# Patient Record
Sex: FEMALE | Race: OTHER | HISPANIC OR LATINO
[De-identification: names, ages, dates, MRNs, and addresses within clinical notes are randomized per-mention and may not be internally consistent; named-entity substitution may affect disease eponyms.]

---

## 2017-08-14 ENCOUNTER — APPOINTMENT (OUTPATIENT)
Dept: MAMMOGRAPHY | Facility: CLINIC | Age: 67
End: 2017-08-14

## 2019-09-25 ENCOUNTER — APPOINTMENT (OUTPATIENT)
Dept: NEUROLOGY | Facility: CLINIC | Age: 69
End: 2019-09-25
Payer: MEDICARE

## 2019-09-25 VITALS
HEIGHT: 59 IN | BODY MASS INDEX: 19.96 KG/M2 | WEIGHT: 99 LBS | DIASTOLIC BLOOD PRESSURE: 70 MMHG | SYSTOLIC BLOOD PRESSURE: 140 MMHG

## 2019-09-25 DIAGNOSIS — R20.2 PARESTHESIA OF SKIN: ICD-10-CM

## 2019-09-25 DIAGNOSIS — Z86.39 PERSONAL HISTORY OF OTHER ENDOCRINE, NUTRITIONAL AND METABOLIC DISEASE: ICD-10-CM

## 2019-09-25 DIAGNOSIS — Z86.79 PERSONAL HISTORY OF OTHER DISEASES OF THE CIRCULATORY SYSTEM: ICD-10-CM

## 2019-09-25 DIAGNOSIS — Z87.891 PERSONAL HISTORY OF NICOTINE DEPENDENCE: ICD-10-CM

## 2019-09-25 DIAGNOSIS — G62.9 POLYNEUROPATHY, UNSPECIFIED: ICD-10-CM

## 2019-09-25 PROCEDURE — 99204 OFFICE O/P NEW MOD 45 MIN: CPT

## 2019-09-25 RX ORDER — METFORMIN HYDROCHLORIDE 500 MG/1
500 TABLET, COATED ORAL
Refills: 0 | Status: ACTIVE | COMMUNITY

## 2019-09-25 RX ORDER — TROSPIUM CHLORIDE 20 MG/1
20 TABLET ORAL
Refills: 0 | Status: ACTIVE | COMMUNITY

## 2019-09-25 RX ORDER — GABAPENTIN 300 MG/1
300 CAPSULE ORAL
Refills: 0 | Status: ACTIVE | COMMUNITY

## 2019-09-25 RX ORDER — METOPROLOL SUCCINATE 100 MG/1
100 TABLET, EXTENDED RELEASE ORAL
Refills: 0 | Status: ACTIVE | COMMUNITY

## 2019-09-25 RX ORDER — LEVOTHYROXINE SODIUM 50 UG/1
50 TABLET ORAL
Refills: 0 | Status: ACTIVE | COMMUNITY

## 2019-09-25 RX ORDER — ROSUVASTATIN CALCIUM 10 MG/1
10 TABLET, FILM COATED ORAL
Refills: 0 | Status: ACTIVE | COMMUNITY

## 2019-09-25 RX ORDER — ASPIRIN 81 MG
81 TABLET, DELAYED RELEASE (ENTERIC COATED) ORAL
Refills: 0 | Status: ACTIVE | COMMUNITY

## 2019-11-05 ENCOUNTER — APPOINTMENT (OUTPATIENT)
Dept: NEUROLOGY | Facility: CLINIC | Age: 69
End: 2019-11-05
Payer: MEDICARE

## 2019-11-05 PROCEDURE — 95886 MUSC TEST DONE W/N TEST COMP: CPT

## 2019-11-05 PROCEDURE — 95910 NRV CNDJ TEST 7-8 STUDIES: CPT

## 2021-06-01 ENCOUNTER — OUTPATIENT (OUTPATIENT)
Dept: OUTPATIENT SERVICES | Facility: HOSPITAL | Age: 71
LOS: 1 days | End: 2021-06-01
Payer: MEDICARE

## 2021-06-01 PROCEDURE — G9005: CPT

## 2021-06-03 ENCOUNTER — EMERGENCY (EMERGENCY)
Facility: HOSPITAL | Age: 71
LOS: 1 days | Discharge: HOME | End: 2021-06-03
Attending: EMERGENCY MEDICINE
Payer: MEDICARE

## 2021-06-03 VITALS
TEMPERATURE: 98 F | DIASTOLIC BLOOD PRESSURE: 71 MMHG | WEIGHT: 102.96 LBS | HEART RATE: 82 BPM | OXYGEN SATURATION: 98 % | HEIGHT: 58.66 IN | SYSTOLIC BLOOD PRESSURE: 206 MMHG | RESPIRATION RATE: 18 BRPM

## 2021-06-03 PROCEDURE — 99285 EMERGENCY DEPT VISIT HI MDM: CPT

## 2021-06-03 NOTE — ED ADULT TRIAGE NOTE - CHIEF COMPLAINT QUOTE
PT presents to ED for abdominal pain after eating and bloating/constipation PT presents to ED for abdominal pain after eating and bloating/constipation. PT missed dose of BP meds tonight

## 2021-06-04 VITALS
OXYGEN SATURATION: 99 % | DIASTOLIC BLOOD PRESSURE: 67 MMHG | HEART RATE: 70 BPM | SYSTOLIC BLOOD PRESSURE: 131 MMHG | TEMPERATURE: 100 F | RESPIRATION RATE: 19 BRPM

## 2021-06-04 LAB
ALBUMIN SERPL ELPH-MCNC: 4.1 G/DL — SIGNIFICANT CHANGE UP (ref 3.3–5.2)
ALP SERPL-CCNC: 109 U/L — SIGNIFICANT CHANGE UP (ref 40–120)
ALT FLD-CCNC: 11 U/L — SIGNIFICANT CHANGE UP
ANION GAP SERPL CALC-SCNC: 10 MMOL/L — SIGNIFICANT CHANGE UP (ref 5–17)
APPEARANCE UR: CLEAR — SIGNIFICANT CHANGE UP
AST SERPL-CCNC: 22 U/L — SIGNIFICANT CHANGE UP
BACTERIA # UR AUTO: ABNORMAL
BASOPHILS # BLD AUTO: 0.08 K/UL — SIGNIFICANT CHANGE UP (ref 0–0.2)
BASOPHILS NFR BLD AUTO: 0.9 % — SIGNIFICANT CHANGE UP (ref 0–2)
BILIRUB SERPL-MCNC: 0.5 MG/DL — SIGNIFICANT CHANGE UP (ref 0.4–2)
BILIRUB UR-MCNC: NEGATIVE — SIGNIFICANT CHANGE UP
BUN SERPL-MCNC: 18.4 MG/DL — SIGNIFICANT CHANGE UP (ref 8–20)
CALCIUM SERPL-MCNC: 9.6 MG/DL — SIGNIFICANT CHANGE UP (ref 8.6–10.2)
CHLORIDE SERPL-SCNC: 99 MMOL/L — SIGNIFICANT CHANGE UP (ref 98–107)
CO2 SERPL-SCNC: 24 MMOL/L — SIGNIFICANT CHANGE UP (ref 22–29)
COLOR SPEC: YELLOW — SIGNIFICANT CHANGE UP
CREAT SERPL-MCNC: 1.1 MG/DL — SIGNIFICANT CHANGE UP (ref 0.5–1.3)
DIFF PNL FLD: NEGATIVE — SIGNIFICANT CHANGE UP
EOSINOPHIL # BLD AUTO: 0.41 K/UL — SIGNIFICANT CHANGE UP (ref 0–0.5)
EOSINOPHIL NFR BLD AUTO: 4.5 % — SIGNIFICANT CHANGE UP (ref 0–6)
EPI CELLS # UR: SIGNIFICANT CHANGE UP
GLUCOSE SERPL-MCNC: 196 MG/DL — HIGH (ref 70–99)
GLUCOSE UR QL: NEGATIVE MG/DL — SIGNIFICANT CHANGE UP
HCT VFR BLD CALC: 33.5 % — LOW (ref 34.5–45)
HGB BLD-MCNC: 11 G/DL — LOW (ref 11.5–15.5)
IMM GRANULOCYTES NFR BLD AUTO: 0.3 % — SIGNIFICANT CHANGE UP (ref 0–1.5)
KETONES UR-MCNC: NEGATIVE — SIGNIFICANT CHANGE UP
LACTATE BLDV-MCNC: 1 MMOL/L — SIGNIFICANT CHANGE UP (ref 0.5–2)
LEUKOCYTE ESTERASE UR-ACNC: NEGATIVE — SIGNIFICANT CHANGE UP
LIDOCAIN IGE QN: 30 U/L — SIGNIFICANT CHANGE UP (ref 22–51)
LYMPHOCYTES # BLD AUTO: 1.46 K/UL — SIGNIFICANT CHANGE UP (ref 1–3.3)
LYMPHOCYTES # BLD AUTO: 16.1 % — SIGNIFICANT CHANGE UP (ref 13–44)
MAGNESIUM SERPL-MCNC: 1.6 MG/DL — SIGNIFICANT CHANGE UP (ref 1.6–2.6)
MCHC RBC-ENTMCNC: 30.4 PG — SIGNIFICANT CHANGE UP (ref 27–34)
MCHC RBC-ENTMCNC: 32.8 GM/DL — SIGNIFICANT CHANGE UP (ref 32–36)
MCV RBC AUTO: 92.5 FL — SIGNIFICANT CHANGE UP (ref 80–100)
MONOCYTES # BLD AUTO: 0.66 K/UL — SIGNIFICANT CHANGE UP (ref 0–0.9)
MONOCYTES NFR BLD AUTO: 7.3 % — SIGNIFICANT CHANGE UP (ref 2–14)
NEUTROPHILS # BLD AUTO: 6.45 K/UL — SIGNIFICANT CHANGE UP (ref 1.8–7.4)
NEUTROPHILS NFR BLD AUTO: 70.9 % — SIGNIFICANT CHANGE UP (ref 43–77)
NITRITE UR-MCNC: NEGATIVE — SIGNIFICANT CHANGE UP
NT-PROBNP SERPL-SCNC: 566 PG/ML — HIGH (ref 0–300)
PH UR: 7 — SIGNIFICANT CHANGE UP (ref 5–8)
PLATELET # BLD AUTO: 301 K/UL — SIGNIFICANT CHANGE UP (ref 150–400)
POTASSIUM SERPL-MCNC: 4.9 MMOL/L — SIGNIFICANT CHANGE UP (ref 3.5–5.3)
POTASSIUM SERPL-SCNC: 4.9 MMOL/L — SIGNIFICANT CHANGE UP (ref 3.5–5.3)
PROT SERPL-MCNC: 7.6 G/DL — SIGNIFICANT CHANGE UP (ref 6.6–8.7)
PROT UR-MCNC: 15 MG/DL
RBC # BLD: 3.62 M/UL — LOW (ref 3.8–5.2)
RBC # FLD: 11.7 % — SIGNIFICANT CHANGE UP (ref 10.3–14.5)
RBC CASTS # UR COMP ASSIST: NEGATIVE /HPF — SIGNIFICANT CHANGE UP (ref 0–4)
SODIUM SERPL-SCNC: 133 MMOL/L — LOW (ref 135–145)
SP GR SPEC: 1 — LOW (ref 1.01–1.02)
TROPONIN T SERPL-MCNC: <0.01 NG/ML — SIGNIFICANT CHANGE UP (ref 0–0.06)
UROBILINOGEN FLD QL: NEGATIVE MG/DL — SIGNIFICANT CHANGE UP
WBC # BLD: 9.09 K/UL — SIGNIFICANT CHANGE UP (ref 3.8–10.5)
WBC # FLD AUTO: 9.09 K/UL — SIGNIFICANT CHANGE UP (ref 3.8–10.5)
WBC UR QL: SIGNIFICANT CHANGE UP

## 2021-06-04 PROCEDURE — 96375 TX/PRO/DX INJ NEW DRUG ADDON: CPT

## 2021-06-04 PROCEDURE — 81001 URINALYSIS AUTO W/SCOPE: CPT

## 2021-06-04 PROCEDURE — 87086 URINE CULTURE/COLONY COUNT: CPT

## 2021-06-04 PROCEDURE — 83690 ASSAY OF LIPASE: CPT

## 2021-06-04 PROCEDURE — 80053 COMPREHEN METABOLIC PANEL: CPT

## 2021-06-04 PROCEDURE — 74177 CT ABD & PELVIS W/CONTRAST: CPT | Mod: 26,ME

## 2021-06-04 PROCEDURE — 93005 ELECTROCARDIOGRAM TRACING: CPT

## 2021-06-04 PROCEDURE — 99284 EMERGENCY DEPT VISIT MOD MDM: CPT | Mod: 25

## 2021-06-04 PROCEDURE — 74177 CT ABD & PELVIS W/CONTRAST: CPT

## 2021-06-04 PROCEDURE — 82962 GLUCOSE BLOOD TEST: CPT

## 2021-06-04 PROCEDURE — 83605 ASSAY OF LACTIC ACID: CPT

## 2021-06-04 PROCEDURE — 85025 COMPLETE CBC W/AUTO DIFF WBC: CPT

## 2021-06-04 PROCEDURE — 36415 COLL VENOUS BLD VENIPUNCTURE: CPT

## 2021-06-04 PROCEDURE — 93010 ELECTROCARDIOGRAM REPORT: CPT

## 2021-06-04 PROCEDURE — 84484 ASSAY OF TROPONIN QUANT: CPT

## 2021-06-04 PROCEDURE — 96374 THER/PROPH/DIAG INJ IV PUSH: CPT

## 2021-06-04 PROCEDURE — 83880 ASSAY OF NATRIURETIC PEPTIDE: CPT

## 2021-06-04 PROCEDURE — 83735 ASSAY OF MAGNESIUM: CPT

## 2021-06-04 PROCEDURE — G1004: CPT

## 2021-06-04 RX ORDER — FAMOTIDINE 10 MG/ML
20 INJECTION INTRAVENOUS ONCE
Refills: 0 | Status: COMPLETED | OUTPATIENT
Start: 2021-06-04 | End: 2021-06-04

## 2021-06-04 RX ORDER — ONDANSETRON 8 MG/1
4 TABLET, FILM COATED ORAL ONCE
Refills: 0 | Status: COMPLETED | OUTPATIENT
Start: 2021-06-04 | End: 2021-06-04

## 2021-06-04 RX ORDER — METOPROLOL TARTRATE 50 MG
100 TABLET ORAL ONCE
Refills: 0 | Status: COMPLETED | OUTPATIENT
Start: 2021-06-04 | End: 2021-06-04

## 2021-06-04 RX ORDER — IOHEXOL 300 MG/ML
30 INJECTION, SOLUTION INTRAVENOUS ONCE
Refills: 0 | Status: COMPLETED | OUTPATIENT
Start: 2021-06-04 | End: 2021-06-04

## 2021-06-04 RX ADMIN — IOHEXOL 30 MILLILITER(S): 300 INJECTION, SOLUTION INTRAVENOUS at 01:03

## 2021-06-04 RX ADMIN — ONDANSETRON 4 MILLIGRAM(S): 8 TABLET, FILM COATED ORAL at 01:04

## 2021-06-04 RX ADMIN — FAMOTIDINE 20 MILLIGRAM(S): 10 INJECTION INTRAVENOUS at 01:03

## 2021-06-04 RX ADMIN — Medication 100 MILLIGRAM(S): at 01:08

## 2021-06-04 NOTE — ED ADULT NURSE NOTE - OBJECTIVE STATEMENT
pt is stable, no distress, no N/V, iv in place, labs drawn, pt went to CT. scan, no distress, safety maintained,   PA at bedside, pt D/Enio

## 2021-06-04 NOTE — ED ADULT NURSE NOTE - NS_NURSE_DISC_ED_ALL_ED_PROVIDEDBY
Detail Level: Detailed Add 34770 Cpt? (Important Note: In 2017 The Use Of 23177 Is Being Tracked By Cms To Determine Future Global Period Reimbursement For Global Periods): yes Wound Evaluated By (Optional): SHANNON Hooper MD Wound Diameter In Cm(Optional): 0 Wound Crusting?: clean Sutures?: intact Wound Edema?: mild Wound Color?: pink ACP

## 2021-06-04 NOTE — ED PROVIDER NOTE - OBJECTIVE STATEMENT
71 y/o female with pmhx of HTN, IDDM, HLD, CHF presents to the ED c/o intermittent epigastric pain after eating worsening today with distention. Notes for the past week she would have issues with decrease in bowel movement. Notes had a bowel movement earlier today but notes pain continued. Notes worsening after eating. Felt the pain burning in her upper abdomen radiating into the chest. Pt did not take her bp medication today due to the fact that she had some nausea when she ate recently. Normally takes metoprolol 100 mg xl at night. Denies fevers, chills, shortness of breath, diarrhea, pain with urination, freq urination.

## 2021-06-04 NOTE — ED PROVIDER NOTE - ATTENDING CONTRIBUTION TO CARE
Postprandial epigastric pain improving with supportive care, CT imaging with no acute findings, labs with no actionable findings. Outpatient GI follow up, return precautions.

## 2021-06-04 NOTE — ED PROVIDER NOTE - CLINICAL SUMMARY MEDICAL DECISION MAKING FREE TEXT BOX
69 y/o female with pmhx of HTN, IDDM, HLD, CHF presents to the ED c/o intermittent epigastric pain after eating worsening today with distention. pt with nsr on ekg, ct negative for intraabdominal pathology, Pt reassessed, pt feeling better at this time, vss, pt able to walk, talk and vocalized plan of action. Discussed in depth and explained to pt in depth the next steps that need to be taking including proper follow up with PCP or specialists. All incidental findings were discussed with pt as well. Pt verbalized their concerns and all questions were answered. Pt understands dispo and wants discharge. Given good instructions when to return to ED and importance of f/u.

## 2021-06-04 NOTE — ED ADULT NURSE NOTE - CHIEF COMPLAINT QUOTE
PT presents to ED for abdominal pain after eating and bloating/constipation. PT missed dose of BP meds tonight

## 2021-06-04 NOTE — ED PROVIDER NOTE - PATIENT PORTAL LINK FT
You can access the FollowMyHealth Patient Portal offered by [facility] by registering at the following website: [email or web address]. By joining [employer]’s FollowMyHealth portal, you will also be able to view your health information using other applications (apps) compatible with our system.

## 2021-06-04 NOTE — ED PROVIDER NOTE - PHYSICAL EXAMINATION
General-alert and oriented to person place and time, nontoxic appearing, pleasant cooperative, NAD  HEENT-normocephalic, atraumatic, NT to palp, EOMI, PERRLA, no conjunctival injections,   Chest- Nt to palp, no reproducible pain  Cardio-s1,s2 present, regular rate and rhythm  Resp- talks in full sentences, symmetrical chest rise, CTA bilat, no evidence of wheezes, rhonchi noted  Abdomen- bowel sounds presnt in all 4 quadrants, soft, NT/ND, no guarding, no rebound tenderness  MSK- moves all extremities, able to ambulate without issues  Back- nt to palp of cervical, thoracic, lumbar spine, nt to palp of paraspinal m., No CVA tenderness  Neuro- no focal deficits, sensation intact

## 2021-06-04 NOTE — ED PROVIDER NOTE - NSFOLLOWUPINSTRUCTIONS_ED_ALL_ED_FT
1) Arianne un seguimiento con andino médico de atención primaria en los próximos 5-7 días. Llame mañana para concertar eugene espinoza. Si no puede hacer un seguimiento con andino médico de atención primaria, regrese al servicio de urgencias por cualquier problema urgente.  2) Se le entregó eugene copia de las pruebas realizadas hoy. Traiga los resultados y revíselos con andino médico de atención primaria.  3) Si tiene algún empeoramiento de los síntomas o cualquier otra inquietud, regrese al servicio de urgencias de inmediato.  4) Continúe tomando abiola medicamentos caseros según las indicaciones.

## 2021-06-06 LAB
CULTURE RESULTS: SIGNIFICANT CHANGE UP
SPECIMEN SOURCE: SIGNIFICANT CHANGE UP

## 2021-06-18 ENCOUNTER — TRANSCRIPTION ENCOUNTER (OUTPATIENT)
Age: 71
End: 2021-06-18

## 2021-06-18 ENCOUNTER — INPATIENT (INPATIENT)
Facility: HOSPITAL | Age: 71
LOS: 0 days | Discharge: HOME | DRG: 641 | End: 2021-06-18
Attending: STUDENT IN AN ORGANIZED HEALTH CARE EDUCATION/TRAINING PROGRAM | Admitting: INTERNAL MEDICINE
Payer: MEDICARE

## 2021-06-18 VITALS
TEMPERATURE: 98 F | OXYGEN SATURATION: 99 % | WEIGHT: 98.11 LBS | SYSTOLIC BLOOD PRESSURE: 186 MMHG | RESPIRATION RATE: 18 BRPM | DIASTOLIC BLOOD PRESSURE: 69 MMHG | HEART RATE: 104 BPM | HEIGHT: 58.66 IN

## 2021-06-18 VITALS
OXYGEN SATURATION: 100 % | RESPIRATION RATE: 16 BRPM | DIASTOLIC BLOOD PRESSURE: 72 MMHG | HEART RATE: 97 BPM | SYSTOLIC BLOOD PRESSURE: 170 MMHG

## 2021-06-18 DIAGNOSIS — I50.22 CHRONIC SYSTOLIC (CONGESTIVE) HEART FAILURE: ICD-10-CM

## 2021-06-18 DIAGNOSIS — E87.1 HYPO-OSMOLALITY AND HYPONATREMIA: ICD-10-CM

## 2021-06-18 DIAGNOSIS — E10.9 TYPE 1 DIABETES MELLITUS WITHOUT COMPLICATIONS: ICD-10-CM

## 2021-06-18 DIAGNOSIS — I10 ESSENTIAL (PRIMARY) HYPERTENSION: ICD-10-CM

## 2021-06-18 DIAGNOSIS — Z02.9 ENCOUNTER FOR ADMINISTRATIVE EXAMINATIONS, UNSPECIFIED: ICD-10-CM

## 2021-06-18 LAB
ALBUMIN SERPL ELPH-MCNC: 4.2 G/DL — SIGNIFICANT CHANGE UP (ref 3.3–5.2)
ALP SERPL-CCNC: 68 U/L — SIGNIFICANT CHANGE UP (ref 40–120)
ALT FLD-CCNC: 26 U/L — SIGNIFICANT CHANGE UP
ANION GAP SERPL CALC-SCNC: 13 MMOL/L — SIGNIFICANT CHANGE UP (ref 5–17)
ANION GAP SERPL CALC-SCNC: 9 MMOL/L — SIGNIFICANT CHANGE UP (ref 5–17)
APPEARANCE UR: CLEAR — SIGNIFICANT CHANGE UP
AST SERPL-CCNC: 28 U/L — SIGNIFICANT CHANGE UP
BACTERIA # UR AUTO: ABNORMAL
BASOPHILS # BLD AUTO: 0.06 K/UL — SIGNIFICANT CHANGE UP (ref 0–0.2)
BASOPHILS NFR BLD AUTO: 1 % — SIGNIFICANT CHANGE UP (ref 0–2)
BILIRUB SERPL-MCNC: 0.4 MG/DL — SIGNIFICANT CHANGE UP (ref 0.4–2)
BILIRUB UR-MCNC: NEGATIVE — SIGNIFICANT CHANGE UP
BUN SERPL-MCNC: 12.9 MG/DL — SIGNIFICANT CHANGE UP (ref 8–20)
BUN SERPL-MCNC: 15.4 MG/DL — SIGNIFICANT CHANGE UP (ref 8–20)
CALCIUM SERPL-MCNC: 8.7 MG/DL — SIGNIFICANT CHANGE UP (ref 8.6–10.2)
CALCIUM SERPL-MCNC: 9.3 MG/DL — SIGNIFICANT CHANGE UP (ref 8.6–10.2)
CHLORIDE SERPL-SCNC: 101 MMOL/L — SIGNIFICANT CHANGE UP (ref 98–107)
CHLORIDE SERPL-SCNC: 94 MMOL/L — LOW (ref 98–107)
CHLORIDE UR-SCNC: <27 MMOL/L — SIGNIFICANT CHANGE UP
CO2 SERPL-SCNC: 19 MMOL/L — LOW (ref 22–29)
CO2 SERPL-SCNC: 21 MMOL/L — LOW (ref 22–29)
COLOR SPEC: SIGNIFICANT CHANGE UP
CREAT SERPL-MCNC: 1.04 MG/DL — SIGNIFICANT CHANGE UP (ref 0.5–1.3)
CREAT SERPL-MCNC: 1.29 MG/DL — SIGNIFICANT CHANGE UP (ref 0.5–1.3)
DIFF PNL FLD: ABNORMAL
EOSINOPHIL # BLD AUTO: 0.19 K/UL — SIGNIFICANT CHANGE UP (ref 0–0.5)
EOSINOPHIL NFR BLD AUTO: 3.2 % — SIGNIFICANT CHANGE UP (ref 0–6)
EPI CELLS # UR: SIGNIFICANT CHANGE UP
GLUCOSE SERPL-MCNC: 203 MG/DL — HIGH (ref 70–99)
GLUCOSE SERPL-MCNC: 222 MG/DL — HIGH (ref 70–99)
GLUCOSE UR QL: 250 MG/DL
HCT VFR BLD CALC: 31.8 % — LOW (ref 34.5–45)
HGB BLD-MCNC: 10.6 G/DL — LOW (ref 11.5–15.5)
IMM GRANULOCYTES NFR BLD AUTO: 0.2 % — SIGNIFICANT CHANGE UP (ref 0–1.5)
KETONES UR-MCNC: NEGATIVE — SIGNIFICANT CHANGE UP
LEUKOCYTE ESTERASE UR-ACNC: ABNORMAL
LIDOCAIN IGE QN: 32 U/L — SIGNIFICANT CHANGE UP (ref 22–51)
LYMPHOCYTES # BLD AUTO: 1.16 K/UL — SIGNIFICANT CHANGE UP (ref 1–3.3)
LYMPHOCYTES # BLD AUTO: 19.8 % — SIGNIFICANT CHANGE UP (ref 13–44)
MAGNESIUM SERPL-MCNC: 2.1 MG/DL — SIGNIFICANT CHANGE UP (ref 1.6–2.6)
MCHC RBC-ENTMCNC: 30.3 PG — SIGNIFICANT CHANGE UP (ref 27–34)
MCHC RBC-ENTMCNC: 33.3 GM/DL — SIGNIFICANT CHANGE UP (ref 32–36)
MCV RBC AUTO: 90.9 FL — SIGNIFICANT CHANGE UP (ref 80–100)
MONOCYTES # BLD AUTO: 0.5 K/UL — SIGNIFICANT CHANGE UP (ref 0–0.9)
MONOCYTES NFR BLD AUTO: 8.5 % — SIGNIFICANT CHANGE UP (ref 2–14)
NEUTROPHILS # BLD AUTO: 3.94 K/UL — SIGNIFICANT CHANGE UP (ref 1.8–7.4)
NEUTROPHILS NFR BLD AUTO: 67.3 % — SIGNIFICANT CHANGE UP (ref 43–77)
NITRITE UR-MCNC: NEGATIVE — SIGNIFICANT CHANGE UP
OSMOLALITY SERPL: 288 MOSMOL/KG — SIGNIFICANT CHANGE UP (ref 280–301)
OSMOLALITY UR: 138 MOSM/KG — LOW (ref 300–1000)
PH UR: 6 — SIGNIFICANT CHANGE UP (ref 5–8)
PLATELET # BLD AUTO: 282 K/UL — SIGNIFICANT CHANGE UP (ref 150–400)
POTASSIUM SERPL-MCNC: 4 MMOL/L — SIGNIFICANT CHANGE UP (ref 3.5–5.3)
POTASSIUM SERPL-MCNC: 4.2 MMOL/L — SIGNIFICANT CHANGE UP (ref 3.5–5.3)
POTASSIUM SERPL-SCNC: 4 MMOL/L — SIGNIFICANT CHANGE UP (ref 3.5–5.3)
POTASSIUM SERPL-SCNC: 4.2 MMOL/L — SIGNIFICANT CHANGE UP (ref 3.5–5.3)
POTASSIUM UR-SCNC: 7 MMOL/L — SIGNIFICANT CHANGE UP
PROT SERPL-MCNC: 7.7 G/DL — SIGNIFICANT CHANGE UP (ref 6.6–8.7)
PROT UR-MCNC: 30 MG/DL
RBC # BLD: 3.5 M/UL — LOW (ref 3.8–5.2)
RBC # FLD: 12 % — SIGNIFICANT CHANGE UP (ref 10.3–14.5)
RBC CASTS # UR COMP ASSIST: ABNORMAL /HPF (ref 0–4)
SARS-COV-2 RNA SPEC QL NAA+PROBE: SIGNIFICANT CHANGE UP
SODIUM SERPL-SCNC: 125 MMOL/L — LOW (ref 135–145)
SODIUM SERPL-SCNC: 131 MMOL/L — LOW (ref 135–145)
SODIUM UR-SCNC: 30 MMOL/L — SIGNIFICANT CHANGE UP
SP GR SPEC: 1 — LOW (ref 1.01–1.02)
TROPONIN T SERPL-MCNC: <0.01 NG/ML — SIGNIFICANT CHANGE UP (ref 0–0.06)
UROBILINOGEN FLD QL: NEGATIVE MG/DL — SIGNIFICANT CHANGE UP
UUN UR-MCNC: 116 MG/DL — SIGNIFICANT CHANGE UP
WBC # BLD: 5.86 K/UL — SIGNIFICANT CHANGE UP (ref 3.8–10.5)
WBC # FLD AUTO: 5.86 K/UL — SIGNIFICANT CHANGE UP (ref 3.8–10.5)
WBC UR QL: ABNORMAL

## 2021-06-18 PROCEDURE — 99285 EMERGENCY DEPT VISIT HI MDM: CPT

## 2021-06-18 PROCEDURE — 93010 ELECTROCARDIOGRAM REPORT: CPT

## 2021-06-18 PROCEDURE — 82962 GLUCOSE BLOOD TEST: CPT

## 2021-06-18 PROCEDURE — 96374 THER/PROPH/DIAG INJ IV PUSH: CPT

## 2021-06-18 PROCEDURE — 84300 ASSAY OF URINE SODIUM: CPT

## 2021-06-18 PROCEDURE — 83690 ASSAY OF LIPASE: CPT

## 2021-06-18 PROCEDURE — 84540 ASSAY OF URINE/UREA-N: CPT

## 2021-06-18 PROCEDURE — 82436 ASSAY OF URINE CHLORIDE: CPT

## 2021-06-18 PROCEDURE — 83735 ASSAY OF MAGNESIUM: CPT

## 2021-06-18 PROCEDURE — 36415 COLL VENOUS BLD VENIPUNCTURE: CPT

## 2021-06-18 PROCEDURE — 84484 ASSAY OF TROPONIN QUANT: CPT

## 2021-06-18 PROCEDURE — 71046 X-RAY EXAM CHEST 2 VIEWS: CPT | Mod: 26

## 2021-06-18 PROCEDURE — 87086 URINE CULTURE/COLONY COUNT: CPT

## 2021-06-18 PROCEDURE — 85025 COMPLETE CBC W/AUTO DIFF WBC: CPT

## 2021-06-18 PROCEDURE — 83930 ASSAY OF BLOOD OSMOLALITY: CPT

## 2021-06-18 PROCEDURE — 87635 SARS-COV-2 COVID-19 AMP PRB: CPT

## 2021-06-18 PROCEDURE — 84133 ASSAY OF URINE POTASSIUM: CPT

## 2021-06-18 PROCEDURE — 83935 ASSAY OF URINE OSMOLALITY: CPT

## 2021-06-18 PROCEDURE — 93005 ELECTROCARDIOGRAM TRACING: CPT

## 2021-06-18 PROCEDURE — 99285 EMERGENCY DEPT VISIT HI MDM: CPT | Mod: 25

## 2021-06-18 PROCEDURE — 80053 COMPREHEN METABOLIC PANEL: CPT

## 2021-06-18 PROCEDURE — 96375 TX/PRO/DX INJ NEW DRUG ADDON: CPT

## 2021-06-18 PROCEDURE — 80048 BASIC METABOLIC PNL TOTAL CA: CPT

## 2021-06-18 PROCEDURE — 71046 X-RAY EXAM CHEST 2 VIEWS: CPT

## 2021-06-18 PROCEDURE — 81001 URINALYSIS AUTO W/SCOPE: CPT

## 2021-06-18 PROCEDURE — 99222 1ST HOSP IP/OBS MODERATE 55: CPT

## 2021-06-18 RX ORDER — METFORMIN HYDROCHLORIDE 850 MG/1
1 TABLET ORAL
Qty: 0 | Refills: 0 | DISCHARGE

## 2021-06-18 RX ORDER — FAMOTIDINE 10 MG/ML
1 INJECTION INTRAVENOUS
Qty: 0 | Refills: 0 | DISCHARGE

## 2021-06-18 RX ORDER — FAMOTIDINE 10 MG/ML
20 INJECTION INTRAVENOUS ONCE
Refills: 0 | Status: COMPLETED | OUTPATIENT
Start: 2021-06-18 | End: 2021-06-18

## 2021-06-18 RX ORDER — DEXTROSE 50 % IN WATER 50 %
15 SYRINGE (ML) INTRAVENOUS ONCE
Refills: 0 | Status: DISCONTINUED | OUTPATIENT
Start: 2021-06-18 | End: 2021-06-18

## 2021-06-18 RX ORDER — INSULIN GLARGINE 100 [IU]/ML
15 INJECTION, SOLUTION SUBCUTANEOUS
Qty: 0 | Refills: 0 | DISCHARGE

## 2021-06-18 RX ORDER — GLUCAGON INJECTION, SOLUTION 0.5 MG/.1ML
1 INJECTION, SOLUTION SUBCUTANEOUS ONCE
Refills: 0 | Status: DISCONTINUED | OUTPATIENT
Start: 2021-06-18 | End: 2021-06-18

## 2021-06-18 RX ORDER — INSULIN GLARGINE 100 [IU]/ML
15 INJECTION, SOLUTION SUBCUTANEOUS AT BEDTIME
Refills: 0 | Status: DISCONTINUED | OUTPATIENT
Start: 2021-06-18 | End: 2021-06-18

## 2021-06-18 RX ORDER — SODIUM CHLORIDE 9 MG/ML
1000 INJECTION INTRAMUSCULAR; INTRAVENOUS; SUBCUTANEOUS ONCE
Refills: 0 | Status: COMPLETED | OUTPATIENT
Start: 2021-06-18 | End: 2021-06-18

## 2021-06-18 RX ORDER — OMEPRAZOLE 10 MG/1
1 CAPSULE, DELAYED RELEASE ORAL
Qty: 30 | Refills: 0
Start: 2021-06-18 | End: 2021-07-17

## 2021-06-18 RX ORDER — OMEPRAZOLE 10 MG/1
1 CAPSULE, DELAYED RELEASE ORAL
Qty: 0 | Refills: 0 | DISCHARGE

## 2021-06-18 RX ORDER — METOPROLOL TARTRATE 50 MG
100 TABLET ORAL DAILY
Refills: 0 | Status: DISCONTINUED | OUTPATIENT
Start: 2021-06-18 | End: 2021-06-18

## 2021-06-18 RX ORDER — SODIUM CHLORIDE 9 MG/ML
1000 INJECTION, SOLUTION INTRAVENOUS
Refills: 0 | Status: DISCONTINUED | OUTPATIENT
Start: 2021-06-18 | End: 2021-06-18

## 2021-06-18 RX ORDER — DEXTROSE 50 % IN WATER 50 %
25 SYRINGE (ML) INTRAVENOUS ONCE
Refills: 0 | Status: DISCONTINUED | OUTPATIENT
Start: 2021-06-18 | End: 2021-06-18

## 2021-06-18 RX ORDER — SACUBITRIL AND VALSARTAN 24; 26 MG/1; MG/1
1 TABLET, FILM COATED ORAL
Qty: 0 | Refills: 0 | DISCHARGE

## 2021-06-18 RX ORDER — DEXTROSE 50 % IN WATER 50 %
12.5 SYRINGE (ML) INTRAVENOUS ONCE
Refills: 0 | Status: DISCONTINUED | OUTPATIENT
Start: 2021-06-18 | End: 2021-06-18

## 2021-06-18 RX ORDER — OXYBUTYNIN CHLORIDE 5 MG
1 TABLET ORAL
Qty: 0 | Refills: 0 | DISCHARGE
Start: 2021-06-18

## 2021-06-18 RX ORDER — INSULIN LISPRO 100/ML
VIAL (ML) SUBCUTANEOUS
Refills: 0 | Status: DISCONTINUED | OUTPATIENT
Start: 2021-06-18 | End: 2021-06-18

## 2021-06-18 RX ORDER — SACUBITRIL AND VALSARTAN 24; 26 MG/1; MG/1
1 TABLET, FILM COATED ORAL
Refills: 0 | Status: DISCONTINUED | OUTPATIENT
Start: 2021-06-18 | End: 2021-06-18

## 2021-06-18 RX ORDER — ONDANSETRON 8 MG/1
4 TABLET, FILM COATED ORAL ONCE
Refills: 0 | Status: COMPLETED | OUTPATIENT
Start: 2021-06-18 | End: 2021-06-18

## 2021-06-18 RX ORDER — PANTOPRAZOLE SODIUM 20 MG/1
40 TABLET, DELAYED RELEASE ORAL
Refills: 0 | Status: DISCONTINUED | OUTPATIENT
Start: 2021-06-18 | End: 2021-06-18

## 2021-06-18 RX ORDER — METOPROLOL TARTRATE 50 MG
1 TABLET ORAL
Qty: 0 | Refills: 0 | DISCHARGE

## 2021-06-18 RX ORDER — OXYBUTYNIN CHLORIDE 5 MG
5 TABLET ORAL
Refills: 0 | Status: DISCONTINUED | OUTPATIENT
Start: 2021-06-18 | End: 2021-06-18

## 2021-06-18 RX ORDER — TROSPIUM CHLORIDE 20 MG/1
1 TABLET, FILM COATED ORAL
Qty: 0 | Refills: 0 | DISCHARGE

## 2021-06-18 RX ADMIN — ONDANSETRON 4 MILLIGRAM(S): 8 TABLET, FILM COATED ORAL at 05:39

## 2021-06-18 RX ADMIN — FAMOTIDINE 20 MILLIGRAM(S): 10 INJECTION INTRAVENOUS at 05:39

## 2021-06-18 RX ADMIN — SODIUM CHLORIDE 1000 MILLILITER(S): 9 INJECTION INTRAMUSCULAR; INTRAVENOUS; SUBCUTANEOUS at 06:42

## 2021-06-18 RX ADMIN — Medication 30 MILLILITER(S): at 05:39

## 2021-06-18 NOTE — H&P ADULT - NSHPLABSRESULTS_GEN_ALL_CORE
LABS:                         10.6   5.86  )-----------( 282      ( 2021 05:49 )             31.8     06-18    125<L>  |  94<L>  |  15.4  ----------------------------<  222<H>  4.0   |  19.0<L>  |  1.29    Ca    9.3      2021 05:49  Mg     2.1         TPro  7.7  /  Alb  4.2  /  TBili  0.4  /  DBili  x   /  AST  28  /  ALT  26  /  AlkPhos  68  -18      Urinalysis Basic - ( 2021 07:21 )    Color: Pale Yellow / Appearance: Clear / S.005 / pH: x  Gluc: x / Ketone: Negative  / Bili: Negative / Urobili: Negative mg/dL   Blood: x / Protein: 30 mg/dL / Nitrite: Negative   Leuk Esterase: Moderate / RBC: 3-5 /HPF / WBC 11-25   Sq Epi: x / Non Sq Epi: Few / Bacteria: Occasional      CARDIAC MARKERS ( 2021 05:49 )  x     / <0.01 ng/mL / x     / x     / x              Records reviewed from prior hospitalization.  Labs reviewed remarkable for   EKG personally reviewed   CXR personally reviewed

## 2021-06-18 NOTE — H&P ADULT - HISTORY OF PRESENT ILLNESS
70 yr old F w/a PMH of IDDM, HTN, Chronic systolic heart failure and HLD presents with 2 week of decreased PO intake.  Patient reports that these symptoms have been ongoing for a few weeks and not exacerbated by eating, she reports that she really does not have an appetite.   She describes the discomfort as burning in nature.  Not associated with nausea or vomiting. Of note, patient was seen here 2 weeks ago with similar complaint.  Of note patient was recently placed on abx for same complaint thought to be secondary to colitis

## 2021-06-18 NOTE — ED PROVIDER NOTE - PHYSICAL EXAMINATION
Const: Awake, alert and oriented. In no acute distress. Well appearing.  HEENT: NC/AT. Moist mucous membranes.  Eyes: No scleral icterus. EOMI.  Neck:. Soft and supple. Full ROM without pain.  Cardiac: Regular rate and regular rhythm. +S1/S2. Peripheral pulses 2+ and symmetric. No LE edema.  Resp: Speaking in full sentences. No evidence of respiratory distress. No wheezes, rales or rhonchi.  Abd: Soft, non-tender, non-distended. Normal bowel sounds in all 4 quadrants. No guarding or rebound.  Back: Spine midline and non-tender. No CVAT.  Skin: No rashes, abrasions or lacerations.  Lymph: No cervical lymphadenopathy.  Neuro: A&Ox3, moving all extremities symmetrically, follows commands, motor noemy upper and lower ext 5/5, sensory symm and intact CN 2-12 grossly intact, no ataxia, no nystagmus, no dysmetria, no ddk, symm noemy, no pronator drift

## 2021-06-18 NOTE — ED ADULT NURSE REASSESSMENT NOTE - NS ED NURSE REASSESS COMMENT FT1
pt tolerated PO trial, offers no c/o pain at this time, no acute s/s of respiratory distress noted or reported, will continue to monitor

## 2021-06-18 NOTE — ED PROVIDER NOTE - OBJECTIVE STATEMENT
69yo female with pmh of DM, HTN, HLD, CHF presents with weakness. Pt states for the past week has been feeling weak intermittently and decreased PO intake due to lack of desire to eat and nausea. Contrary to triage note pt denies abd pain and diarrhea. Pt also states that at night she sometimes feels tingling of hands and feet bilateral no numbness. Pt also at times feels cold inside her body. Pt came in today because she is concerned she's not eating enough. Pt was here about 2 weeks ago for abd pain states that has resolved (from chart review CTAP showed cholelithiasis). Pt denies fevers, ha, loc, focal neuro deficits, cp/sob/palp, cough, abd pain/v/d, urinary symptoms, recent travel.

## 2021-06-18 NOTE — DISCHARGE NOTE PROVIDER - NSDCMRMEDTOKEN_GEN_ALL_CORE_FT
Entresto 24 mg-26 mg oral tablet: 1 tab(s) orally 2 times a day  Lantus 100 units/mL subcutaneous solution: 15 unit(s) subcutaneous once a day (at bedtime)  metFORMIN 500 mg oral tablet, extended release: 1 tab(s) orally once a day  Metoprolol Succinate  mg oral tablet, extended release: 1 tab(s) orally once a day  omeprazole 40 mg oral delayed release capsule: 1 cap(s) orally once a day  oxybutynin 5 mg oral tablet: 1 tab(s) orally 2 times a day  trospium 20 mg oral tablet: 1 tab(s) orally 2 times a day

## 2021-06-18 NOTE — DISCHARGE NOTE PROVIDER - HOSPITAL COURSE
70 yr old F w/a PMH of IDDM, HTN, Chronic systolic heart failure and HLD presents with 2 week of decreased PO intake.  Patient reports that these symptoms have been ongoing for a few weeks and not exacerbated by eating, she reports that she really does not have an appetite.   She describes the discomfort as burning in nature.  Not associated with nausea or vomiting. Of note, patient was seen here 2 weeks ago with similar complaint.  Of note patient was recently placed on abx for same complaint thought to be secondary to colitis. Sodium improved, after fluid resuscitation.  Patient okay for DC home with outpatient follow up.

## 2021-06-18 NOTE — ED ADULT TRIAGE NOTE - WEIGHT IN LBS
Sneha MERCEDEZ Bassett  [address]  [place] [address]    6/14/2018      Dear Ms. Bassett,      We've received the results back from the laboratory for the samples you gave in clinic.  Your labs are normal. Please contact us at [phone] with any questions or concerns that you have.    I attached your lab results for your records.        Take Care,         Jenniffer Riddle PA-C    Resulted Orders   CBC and Differential   Result Value Ref Range    WBC 5.4 4.0 - 11.0 10e9/L    RBC Count 4.34 3.8 - 5.2 10e12/L    Hemoglobin 13.1 11.7 - 15.7 g/dL    Hematocrit 39.4 35.0 - 47.0 %    MCV 91 78 - 100 fl    MCH 30.2 26.5 - 33.0 pg    MCHC 33.2 31.5 - 36.5 g/dL    RDW 12.7 10.0 - 15.0 %    Platelet Count 178 150 - 450 10e9/L    Diff Method Automated Method     % Neutrophils 56.5 %    % Lymphocytes 27.9 %    % Monocytes 14.2 %    % Eosinophils 0.6 %    % Basophils 0.6 %    % Immature Granulocytes 0.2 %    Absolute Neutrophil 3.1 1.6 - 8.3 10e9/L    Absolute Lymphocytes 1.5 0.8 - 5.3 10e9/L    Absolute Monocytes 0.8 0.0 - 1.3 10e9/L    Absolute Eosinophils 0.0 0.0 - 0.7 10e9/L    Absolute Basophils 0.0 0.0 - 0.2 10e9/L    Abs Immature Granulocytes 0.0 0 - 0.4 10e9/L                     
98.1

## 2021-06-18 NOTE — H&P ADULT - ASSESSMENT
70 yr old F w/a PMH of IDDM, HTN, HLD and Systolic heart failure presents with decreased PO intake found to have hyponatremia

## 2021-06-18 NOTE — ED ADULT NURSE NOTE - OBJECTIVE STATEMENT
Patient A&O accompanied by family member at bedside, presents to the ED c/o weakness, decreased PO intake, nausea and headache.  Patient denies fevers and chills.  Patient was seen here in ED for abdominal pain which has thus resolved.  Patient has no other complaints at this time.

## 2021-06-18 NOTE — ED ADULT TRIAGE NOTE - CHIEF COMPLAINT QUOTE
patient states that she has been feeling shaky nausea chills and has high blood pressure (takes medication) and abdominal discomfort diarrhea unable to sleep for 3 day

## 2021-06-18 NOTE — H&P ADULT - PROBLEM SELECTOR PLAN 1
hypovolemic hyponatremia due to poor po intake over the last couple of days.    decreased PO intake likely from acid reflux vs peptic ulcer disease, will discontinue h2 blocker and start patient on omeprazole.    Patient advised to follow up with gastroenterologist outpatient for further workup and management.    Will recheck Sodium after fluid resuscitation, if > or = 130 and can tolerate diet. plan to dc home.        Ct scan of abdomen and pelvis done earlier this month without any abnormality, will hold off on repeating imaging for now.

## 2021-06-18 NOTE — DISCHARGE NOTE NURSING/CASE MANAGEMENT/SOCIAL WORK - PATIENT PORTAL LINK FT
You can access the FollowMyHealth Patient Portal offered by [facility] by registering at the following website: [email or web address]. By joining [employer]s FollowMyHealth portal, you will also be able to view your health information using other applications (apps) compatible with our system.

## 2021-06-18 NOTE — ED PROVIDER NOTE - CLINICAL SUMMARY MEDICAL DECISION MAKING FREE TEXT BOX
71yo female with pmh of DM, HTN, HLD, CHF presents with weakness. 71yo female with pmh of DM, HTN, HLD, CHF presents with weakness pt found to have symptomatic hypoNa, IVF started, admit for further management

## 2021-06-19 LAB
CULTURE RESULTS: SIGNIFICANT CHANGE UP
SPECIMEN SOURCE: SIGNIFICANT CHANGE UP

## 2021-06-25 DIAGNOSIS — Z71.89 OTHER SPECIFIED COUNSELING: ICD-10-CM

## 2021-08-10 ENCOUNTER — APPOINTMENT (OUTPATIENT)
Dept: GASTROENTEROLOGY | Facility: CLINIC | Age: 71
End: 2021-08-10
Payer: MEDICARE

## 2021-08-10 VITALS
TEMPERATURE: 97.2 F | OXYGEN SATURATION: 98 % | DIASTOLIC BLOOD PRESSURE: 60 MMHG | BODY MASS INDEX: 19.15 KG/M2 | RESPIRATION RATE: 14 BRPM | HEIGHT: 59 IN | WEIGHT: 95 LBS | HEART RATE: 83 BPM | SYSTOLIC BLOOD PRESSURE: 100 MMHG

## 2021-08-10 DIAGNOSIS — K59.00 CONSTIPATION, UNSPECIFIED: ICD-10-CM

## 2021-08-10 DIAGNOSIS — Z12.11 ENCOUNTER FOR SCREENING FOR MALIGNANT NEOPLASM OF COLON: ICD-10-CM

## 2021-08-10 DIAGNOSIS — K52.9 NONINFECTIVE GASTROENTERITIS AND COLITIS, UNSPECIFIED: ICD-10-CM

## 2021-08-10 PROBLEM — E78.5 HYPERLIPIDEMIA, UNSPECIFIED: Chronic | Status: ACTIVE | Noted: 2021-06-18

## 2021-08-10 PROBLEM — I10 ESSENTIAL (PRIMARY) HYPERTENSION: Chronic | Status: ACTIVE | Noted: 2021-06-18

## 2021-08-10 PROCEDURE — 99205 OFFICE O/P NEW HI 60 MIN: CPT

## 2021-08-10 RX ORDER — POLYETHYLENE GLYCOL 3350 17 G/17G
17 POWDER, FOR SOLUTION ORAL DAILY
Qty: 1 | Refills: 0 | Status: ACTIVE | COMMUNITY
Start: 2021-08-10

## 2021-08-10 RX ORDER — OMEPRAZOLE 40 MG/1
40 CAPSULE, DELAYED RELEASE ORAL
Qty: 30 | Refills: 0 | Status: ACTIVE | COMMUNITY
Start: 2021-08-10

## 2021-08-10 RX ORDER — POLYETHYLENE GLYCOL 3350 AND ELECTROLYTES WITH LEMON FLAVOR 236; 22.74; 6.74; 5.86; 2.97 G/4L; G/4L; G/4L; G/4L; G/4L
236 POWDER, FOR SOLUTION ORAL
Qty: 1 | Refills: 0 | Status: ACTIVE | COMMUNITY
Start: 2021-08-10 | End: 1900-01-01

## 2021-08-10 RX ORDER — DOCUSATE SODIUM 100 MG/1
CAPSULE ORAL
Refills: 0 | Status: DISCONTINUED | COMMUNITY
End: 2021-08-10

## 2021-08-10 RX ORDER — LACTULOSE 10 G/15ML
20 SOLUTION ORAL
Qty: 1800 | Refills: 3 | Status: ACTIVE | COMMUNITY
Start: 2021-08-10 | End: 1900-01-01

## 2021-08-10 RX ORDER — HYDRALAZINE HYDROCHLORIDE 25 MG/1
25 TABLET ORAL EVERY 6 HOURS
Qty: 30 | Refills: 0 | Status: ACTIVE | COMMUNITY
Start: 2021-08-10

## 2021-08-10 RX ORDER — SACUBITRIL AND VALSARTAN 49; 51 MG/1; MG/1
49-51 TABLET, FILM COATED ORAL TWICE DAILY
Qty: 30 | Refills: 0 | Status: ACTIVE | COMMUNITY
Start: 2021-08-10

## 2021-08-10 RX ORDER — HYDROCHLOROTHIAZIDE 25 MG/1
25 TABLET ORAL DAILY
Qty: 30 | Refills: 0 | Status: ACTIVE | COMMUNITY
Start: 2021-08-10

## 2021-08-10 RX ORDER — AMITRIPTYLINE HYDROCHLORIDE 50 MG/1
50 TABLET, FILM COATED ORAL
Refills: 0 | Status: DISCONTINUED | COMMUNITY
End: 2021-08-10

## 2021-08-10 RX ORDER — DAPAGLIFLOZIN 5 MG/1
5 TABLET, FILM COATED ORAL DAILY
Qty: 30 | Refills: 0 | Status: ACTIVE | COMMUNITY
Start: 2021-08-10

## 2021-08-10 RX ORDER — SITAGLIPTIN 50 MG/1
50 TABLET, FILM COATED ORAL DAILY
Qty: 30 | Refills: 0 | Status: ACTIVE | COMMUNITY
Start: 2021-08-10

## 2021-08-10 RX ORDER — GLIMEPIRIDE 4 MG/1
4 TABLET ORAL
Refills: 0 | Status: DISCONTINUED | COMMUNITY
End: 2021-08-10

## 2021-08-10 RX ORDER — INSULIN DETEMIR 100 [IU]/ML
100 INJECTION, SOLUTION SUBCUTANEOUS
Refills: 0 | Status: DISCONTINUED | COMMUNITY
End: 2021-08-10

## 2021-08-10 NOTE — PHYSICAL EXAM
[General Appearance - Alert] : alert [General Appearance - In No Acute Distress] : in no acute distress [Sclera] : the sclera and conjunctiva were normal [PERRL With Normal Accommodation] : pupils were equal in size, round, and reactive to light [Extraocular Movements] : extraocular movements were intact [Outer Ear] : the ears and nose were normal in appearance [Oropharynx] : the oropharynx was normal [Neck Appearance] : the appearance of the neck was normal [Auscultation Breath Sounds / Voice Sounds] : lungs were clear to auscultation bilaterally [Heart Rate And Rhythm] : heart rate was normal and rhythm regular [Heart Sounds] : normal S1 and S2 [Heart Sounds Gallop] : no gallops [Murmurs] : no murmurs [Heart Sounds Pericardial Friction Rub] : no pericardial rub [Edema] : there was no peripheral edema [Bowel Sounds] : normal bowel sounds [Abdomen Soft] : soft [Abdomen Tenderness] : non-tender [] : no hepato-splenomegaly [Abdomen Mass (___ Cm)] : no abdominal mass palpated [No Focal Deficits] : no focal deficits [Oriented To Time, Place, And Person] : oriented to person, place, and time [Impaired Insight] : insight and judgment were intact [Affect] : the affect was normal

## 2021-08-10 NOTE — REASON FOR VISIT
[Post Hospitalization] : a post hospitalization visit [FreeTextEntry1] : abdominal pain, constipation and colitis

## 2021-08-10 NOTE — ADDENDUM
[FreeTextEntry1] : I, Nitza Falcon PA-C, acted as a scribe for the services dictated to me by PRANAV Goodman in this document on Aug 10, 2021 for DIANNA HUNT .\par

## 2021-08-10 NOTE — HISTORY OF PRESENT ILLNESS
[Heartburn] : denies heartburn [Nausea] : denies nausea [Vomiting] : denies vomiting [Diarrhea] : denies diarrhea [Constipation] : constipation [Abdominal Pain] : abdominal pain [Abdominal Swelling] : abdominal swelling [de-identified] : \ethel Patient is a 71 year female, with PMH of HTN, DMII, diabetic neuropathy, possible CHF, who presents for follow up after hospitalization. Patient is a poor historian and has been to multiple hospitals and different health systems over the last few months. \par \ethel Patient was having abdominal pain, constipation, and bloating and was admitted to [facility] on 6/6/21 x 4 days and diagnosed with colitis. She does not know how this diagnosis was made and denies having had a colonoscopy at that time. She was given antibiotics x 7 days and completed it in June. She has since had persistent constipation for which she takes Miralax with little relief - still has small, hard stools and incomplete emptying. \par \par Patient on Entresto but she is unsure why. She saw cardiology recently. \par \ethel Patient is a poorly controlled diabetic. Labs at [facility] last month showed glucose in the 200 range. She was on insulin but her endocrinologist at [place] switched her from insulin to Januvia and metformin and Farxiga instead. \par \ethel Patient was given Omeprazole 40mg PO QD from [facility] but denies GERD. \par \par She had EGD/Colonoscopy about 15 years ago, unsure of the results. \par \ethel Patient has been seen in multiple hospitals and health systems of the least few months. She was seen at [facility] ER 6/4/21 and CT abd/pel was normal. She was then admitted to [facility] on 6/6/21 and per pt was told she had colitis. She was discharged on 6/10/21 and was later admitted to [facility] on 6/18/21 for hyponatremia. \par \par Patient denies pyrosis, dysphagia, rectal bleeding, nausea, vomiting, or unexplained weight loss.\par

## 2021-08-10 NOTE — ASSESSMENT
[FreeTextEntry1] : Patient is a 70 y/o female with PMH HTN, DMII, HLD, possible colitis, possible CHF, diabetic neuropathy, and constipation. \par \par Patient with multiple complaints and multiple visits to different health systems. Will request records from [facility] for recent hospitalization for which she was diagnosed with "colitis" and treated with antibiotics. \par \par Colonoscopy to be scheduled at [facility]. I have discussed the indications, risks and benefits of procedure with patient. Risks include, but not limited to, bleeding, perforation, infection, and reaction to anesthesia. Alternatives to colonoscopy discussed with patient. Patient was given the opportunity to ask questions, all questions were answered. The patient agrees to proceed with colonoscopy. Patient is medically optimized for colonoscopy. \par \par Golytely prep to be used. Bowel prep instructions discussed at length. \par \par Cardiac clearance needed prior to colonoscopy along with pre surgical testing\par \par For constipation, can try Lactulose instead of Miralax since patient still constipated with taking miralax daily. \par \par I evaluated this patient with Nitza and agree with the above assessment and management plan for this complicated patient with a complicated medical history who appears to have little insight into her medical history and even with  service is a poor historian.  We will try to obtain records from her recent hospitalization at [facility] and she will require preprocedure cardiac clearance and PSTs prior to colonoscopy at [facility].  It is not clear that she understood any of the above instructions and management plan but we will hold for the best.  These instructions were relayed to her in Danish by my staff who speaks fluent Danish.\par

## 2021-08-27 DIAGNOSIS — Z01.818 ENCOUNTER FOR OTHER PREPROCEDURAL EXAMINATION: ICD-10-CM

## 2021-08-28 ENCOUNTER — APPOINTMENT (OUTPATIENT)
Dept: DISASTER EMERGENCY | Facility: CLINIC | Age: 71
End: 2021-08-28

## 2021-08-31 ENCOUNTER — APPOINTMENT (OUTPATIENT)
Dept: GASTROENTEROLOGY | Facility: HOSPITAL | Age: 71
End: 2021-08-31

## 2022-04-29 NOTE — ED ADULT TRIAGE NOTE - ESI TRIAGE ACUITY LEVEL, MLM
Patient discharged to home in stable condition after patient verbalizes understanding of discharge instructions. Patient agreeable to  medication ordered from the pharmacy and to follow up with MD in office as scheduled. Patient home ambulatory, accompanied by self. All personal belongings home with patient.  
3

## 2022-06-30 NOTE — ED PROVIDER NOTE - NEURO NEGATIVE STATEMENT, MLM
[facility] GENERAL PATIENT MESSAGE    Caller name: Griselda Merchant    Contact number: [phone]    Relationship to patient: family member    On patient contact list? Yes    Provider name:Dr. Adam Munguia    Reason for call: Patient's sister Griselda is calling because pt had a seizure last night at around midnight. Griselda is looking to discuss if seizure activity is due to patient's medication increase: lamotigine.  Please advise.    Is this an urgent message (e.g., does caller require a response today?): No - use script below    Turnaround time given to caller:   \"This message will be sent to [state provider's full name]. The clinical team will return your call as soon as they review your message. Urgent messages will be returned by the end of the day. Non-urgent messages will be returned within 2 business days.\"  
Message left for patient's sister to call back.  
Spoke with patient's sister Griselda who reports patient having a very severe seizure last night at midnight. Patient was in bed and had generalized shaking, with teeth clenched, but was able to speak somewhat and respond appropriately to some questions. Grisleda states patient has not had a seizure in two weeks, and this one was the worst she has ever seen in 5 years. Patient has not missed any doses of med and has been getting enough sleep. Patient has been somewhat irritable the last two days though. Episode last night lasted 5-7 min. Overall patient had been doing better since his dosing was increased by Dr Munguia on 5/12/22  
no loss of consciousness, no gait abnormality, no headache, no sensory deficits, and no weakness.

## 2023-03-31 ENCOUNTER — OFFICE (OUTPATIENT)
Dept: URBAN - METROPOLITAN AREA CLINIC 94 | Facility: CLINIC | Age: 73
Setting detail: OPHTHALMOLOGY
End: 2023-03-31
Payer: MEDICAID

## 2023-03-31 DIAGNOSIS — H35.3132: ICD-10-CM

## 2023-03-31 DIAGNOSIS — E11.3311: ICD-10-CM

## 2023-03-31 DIAGNOSIS — H25.13: ICD-10-CM

## 2023-03-31 DIAGNOSIS — E11.3312: ICD-10-CM

## 2023-03-31 DIAGNOSIS — H04.123: ICD-10-CM

## 2023-03-31 DIAGNOSIS — H40.013: ICD-10-CM

## 2023-03-31 PROCEDURE — 99214 OFFICE O/P EST MOD 30 MIN: CPT | Performed by: OPHTHALMOLOGY

## 2023-03-31 PROCEDURE — 92083 EXTENDED VISUAL FIELD XM: CPT | Performed by: OPHTHALMOLOGY

## 2023-03-31 PROCEDURE — 92250 FUNDUS PHOTOGRAPHY W/I&R: CPT | Performed by: OPHTHALMOLOGY

## 2023-03-31 ASSESSMENT — REFRACTION_AUTOREFRACTION
OD_AXIS: 090
OS_AXIS: 083
OD_CYLINDER: -2.75
OS_CYLINDER: -3.25
OS_SPHERE: +2.25
OD_SPHERE: +2.25

## 2023-03-31 ASSESSMENT — LID POSITION - COMMENTS
OD_COMMENTS: 10 BROW PTOSIS 5MMTHIN SKIN, LID CREASE HIGH 3/7/17
OS_COMMENTS: 10 BROW PTOSIS 5MMTHIN SKIN, LID CREASE HIGH 3/7/17
OD_COMMENTS: LEVATOR DEHISANCE.  MRD 1: 0 IPD:  5ULE:&GT
OS_COMMENTS: LEVATOR DEHISANCE.  MRD 1: 0 IPD:  5ULE:&GT

## 2023-03-31 ASSESSMENT — REFRACTION_CURRENTRX
OS_SPHERE: +1.50
OS_VPRISM_DIRECTION: SV
OD_VPRISM_DIRECTION: SV
OD_OVR_VA: 20/
OS_OVR_VA: 20/
OD_SPHERE: +1.50

## 2023-03-31 ASSESSMENT — LID EXAM ASSESSMENTS
OD_MEDIAL_FAT_PROLAPSE: 3+
OD_LEVATOR_FUNCTION: POOR/VARIABLE 7 MM
OS_COMMENTS: 3+ CENTRAL FAT PROLAPS
OS_MEDIAL_FAT_PROLAPSE: 2+
OS_ECTROPION: 1+
OS_CENTRAL_FAT_PROLAPSE: 3+
OD_CENTRAL_FAT_PROLAPSE: 3+
OS_MRD2: 5 MM
OS_LID_CREASE: 12 HIGH
OD_MEDIAL_FAT_PROLAPSE: 2+
OD_LID_CREASE: 12 HIGH
OS_LEVATOR_FUNCTION: POOR/VARIABLE 7 MM
OD_COMMENTS: PROMINENT NASAL MIDDLE AND TEMPORAL FAT PADS BOTH  LOWER LIDS
OS_COMMENTS: 3+ MEDIAL FAT PROLAPSE
OD_COMMENTS: 3+ CENTRAL FAT PROLAPS
OD_MRD2: 5 MM
OD_ECTROPION: 1+
OS_COMMENTS: PROMINENT NASAL MIDDLE AND TEMPORAL FAT PADS BOTH  LOWER LIDS

## 2023-03-31 ASSESSMENT — CONFRONTATIONAL VISUAL FIELD TEST (CVF)
OD_FINDINGS: FULL
OS_FINDINGS: FULL

## 2023-03-31 ASSESSMENT — SPHEQUIV_DERIVED
OD_SPHEQUIV: 0.875
OS_SPHEQUIV: 0.625

## 2023-03-31 ASSESSMENT — VISUAL ACUITY
OD_BCVA: 20/100
OS_BCVA: 20/150

## 2023-03-31 ASSESSMENT — SUPERFICIAL PUNCTATE KERATITIS (SPK)
OD_SPK: 1+
OS_SPK: 1+

## 2023-03-31 ASSESSMENT — PACHYMETRY
OS_CT_UM: 606
OS_CT_CORRECTION: -4

## 2023-03-31 ASSESSMENT — TONOMETRY
OS_IOP_MMHG: 19
OD_IOP_MMHG: 17

## 2023-04-18 ENCOUNTER — OFFICE (OUTPATIENT)
Dept: URBAN - METROPOLITAN AREA CLINIC 94 | Facility: CLINIC | Age: 73
Setting detail: OPHTHALMOLOGY
End: 2023-04-18
Payer: MEDICAID

## 2023-04-18 DIAGNOSIS — H35.3132: ICD-10-CM

## 2023-04-18 DIAGNOSIS — E11.3311: ICD-10-CM

## 2023-04-18 DIAGNOSIS — E11.3313: ICD-10-CM

## 2023-04-18 PROCEDURE — 67210 TREATMENT OF RETINAL LESION: CPT | Performed by: OPHTHALMOLOGY

## 2023-04-18 PROCEDURE — 92235 FLUORESCEIN ANGRPH MLTIFRAME: CPT | Performed by: OPHTHALMOLOGY

## 2023-04-18 PROCEDURE — 92012 INTRM OPH EXAM EST PATIENT: CPT | Performed by: OPHTHALMOLOGY

## 2023-04-18 PROCEDURE — 92134 CPTRZ OPH DX IMG PST SGM RTA: CPT | Performed by: OPHTHALMOLOGY

## 2023-04-18 ASSESSMENT — REFRACTION_CURRENTRX
OD_OVR_VA: 20/
OS_VPRISM_DIRECTION: SV
OD_SPHERE: +1.50
OS_OVR_VA: 20/
OD_VPRISM_DIRECTION: SV
OS_SPHERE: +1.50

## 2023-04-18 ASSESSMENT — SPHEQUIV_DERIVED
OD_SPHEQUIV: 0.875
OS_SPHEQUIV: 0.625

## 2023-04-18 ASSESSMENT — LID EXAM ASSESSMENTS
OS_MRD2: 5 MM
OS_CENTRAL_FAT_PROLAPSE: 3+
OD_COMMENTS: 3+ CENTRAL FAT PROLAPS
OS_LID_CREASE: 12 HIGH
OD_ECTROPION: 1+
OS_LEVATOR_FUNCTION: POOR/VARIABLE 7 MM
OD_MEDIAL_FAT_PROLAPSE: 2+
OD_MEDIAL_FAT_PROLAPSE: 3+
OS_ECTROPION: 1+
OD_CENTRAL_FAT_PROLAPSE: 3+
OS_COMMENTS: 3+ MEDIAL FAT PROLAPSE
OS_MEDIAL_FAT_PROLAPSE: 2+
OD_MRD2: 5 MM
OD_COMMENTS: PROMINENT NASAL MIDDLE AND TEMPORAL FAT PADS BOTH  LOWER LIDS
OD_LEVATOR_FUNCTION: POOR/VARIABLE 7 MM
OD_LID_CREASE: 12 HIGH
OS_COMMENTS: PROMINENT NASAL MIDDLE AND TEMPORAL FAT PADS BOTH  LOWER LIDS
OS_COMMENTS: 3+ CENTRAL FAT PROLAPS

## 2023-04-18 ASSESSMENT — KERATOMETRY
OS_K2POWER_DIOPTERS: 44.75
OD_AXISANGLE_DEGREES: 002
METHOD_AUTO_MANUAL: AUTO
OD_K1POWER_DIOPTERS: 39.75
OS_AXISANGLE_DEGREES: 173
OS_K1POWER_DIOPTERS: 42.50
OD_K2POWER_DIOPTERS: 44.50

## 2023-04-18 ASSESSMENT — LID POSITION - COMMENTS
OS_COMMENTS: LEVATOR DEHISANCE.  MRD 1: 0 IPD:  5ULE:&GT
OD_COMMENTS: 10 BROW PTOSIS 5MMTHIN SKIN, LID CREASE HIGH 3/7/17
OD_COMMENTS: LEVATOR DEHISANCE.  MRD 1: 0 IPD:  5ULE:&GT
OS_COMMENTS: 10 BROW PTOSIS 5MMTHIN SKIN, LID CREASE HIGH 3/7/17

## 2023-04-18 ASSESSMENT — VISUAL ACUITY
OD_BCVA: 20/250
OS_BCVA: 20/200

## 2023-04-18 ASSESSMENT — REFRACTION_AUTOREFRACTION
OS_CYLINDER: -3.25
OD_SPHERE: +2.25
OD_AXIS: 090
OS_SPHERE: +2.25
OD_CYLINDER: -2.75
OS_AXIS: 083

## 2023-04-18 ASSESSMENT — CONFRONTATIONAL VISUAL FIELD TEST (CVF)
OD_FINDINGS: FULL
OS_FINDINGS: FULL

## 2023-04-18 ASSESSMENT — SUPERFICIAL PUNCTATE KERATITIS (SPK)
OD_SPK: 1+
OS_SPK: 1+

## 2023-04-18 ASSESSMENT — AXIALLENGTH_DERIVED
OD_AL: 23.7572
OS_AL: 23.306

## 2023-05-01 NOTE — DISCHARGE NOTE NURSING/CASE MANAGEMENT/SOCIAL WORK - NSDCPEPT PROEDHF_GEN_ALL_CORE
Detail Level: Zone
Render Post-Care Instructions In Note?: no
Number Of Freeze-Thaw Cycles: 3 freeze-thaw cycles
Post-Care Instructions: I reviewed with the patient in detail post-care instructions. Patient is to wear sunprotection, and avoid picking at any of the treated lesions. Pt may apply Vaseline to crusted or scabbing areas.
Duration Of Freeze Thaw-Cycle (Seconds): 3
Show Applicator Variable?: Yes
Consent: The patient's consent was obtained including but not limited to risks of crusting, scabbing, blistering, scarring, darker or lighter pigmentary change, recurrence, incomplete removal and infection.
Call primary care provider for follow up after discharge/Activities as tolerated/Low salt diet/Monitor weight daily/Report signs and symptoms to primary care provider

## 2023-05-03 ENCOUNTER — ASC (OUTPATIENT)
Dept: URBAN - METROPOLITAN AREA SURGERY 8 | Facility: SURGERY | Age: 73
Setting detail: OPHTHALMOLOGY
End: 2023-05-03
Payer: MEDICAID

## 2023-05-03 DIAGNOSIS — E11.3312: ICD-10-CM

## 2023-05-03 PROCEDURE — 67210 TREATMENT OF RETINAL LESION: CPT | Performed by: OPHTHALMOLOGY

## 2023-05-03 ASSESSMENT — LID EXAM ASSESSMENTS
OD_CENTRAL_FAT_PROLAPSE: 3+
OD_LEVATOR_FUNCTION: POOR/VARIABLE 7 MM
OS_ECTROPION: 1+
OS_COMMENTS: 3+ MEDIAL FAT PROLAPSE
OD_COMMENTS: 3+ CENTRAL FAT PROLAPS
OD_ECTROPION: 1+
OS_COMMENTS: 3+ CENTRAL FAT PROLAPS
OS_COMMENTS: PROMINENT NASAL MIDDLE AND TEMPORAL FAT PADS BOTH  LOWER LIDS
OD_MEDIAL_FAT_PROLAPSE: 2+
OD_MEDIAL_FAT_PROLAPSE: 3+
OD_LID_CREASE: 12 HIGH
OS_LID_CREASE: 12 HIGH
OS_CENTRAL_FAT_PROLAPSE: 3+
OS_LEVATOR_FUNCTION: POOR/VARIABLE 7 MM
OS_MRD2: 5 MM
OD_COMMENTS: PROMINENT NASAL MIDDLE AND TEMPORAL FAT PADS BOTH  LOWER LIDS
OS_MEDIAL_FAT_PROLAPSE: 2+
OD_MRD2: 5 MM

## 2023-05-03 ASSESSMENT — SPHEQUIV_DERIVED
OS_SPHEQUIV: 0.625
OD_SPHEQUIV: 0.875

## 2023-05-03 ASSESSMENT — CONFRONTATIONAL VISUAL FIELD TEST (CVF)
OS_FINDINGS: FULL
OD_FINDINGS: FULL

## 2023-05-03 ASSESSMENT — LID POSITION - COMMENTS
OS_COMMENTS: 10 BROW PTOSIS 5MMTHIN SKIN, LID CREASE HIGH 3/7/17
OS_COMMENTS: LEVATOR DEHISANCE.  MRD 1: 0 IPD:  5ULE:&GT
OD_COMMENTS: LEVATOR DEHISANCE.  MRD 1: 0 IPD:  5ULE:&GT
OD_COMMENTS: 10 BROW PTOSIS 5MMTHIN SKIN, LID CREASE HIGH 3/7/17

## 2023-05-03 ASSESSMENT — KERATOMETRY
METHOD_AUTO_MANUAL: AUTO
OS_K1POWER_DIOPTERS: 42.50
OD_K1POWER_DIOPTERS: 39.75
OS_K2POWER_DIOPTERS: 44.75
OS_AXISANGLE_DEGREES: 173
OD_K2POWER_DIOPTERS: 44.50
OD_AXISANGLE_DEGREES: 002

## 2023-05-03 ASSESSMENT — TONOMETRY
OD_IOP_MMHG: 18
OS_IOP_MMHG: 20

## 2023-05-03 ASSESSMENT — REFRACTION_AUTOREFRACTION
OD_CYLINDER: -2.75
OS_SPHERE: +2.25
OS_CYLINDER: -3.25
OS_AXIS: 083
OD_SPHERE: +2.25
OD_AXIS: 090

## 2023-05-03 ASSESSMENT — VISUAL ACUITY
OD_BCVA: 20/100
OS_BCVA: 20/150

## 2023-05-03 ASSESSMENT — AXIALLENGTH_DERIVED
OD_AL: 23.7572
OS_AL: 23.306

## 2023-05-03 ASSESSMENT — SUPERFICIAL PUNCTATE KERATITIS (SPK)
OD_SPK: 1+
OS_SPK: 1+

## 2023-05-03 ASSESSMENT — PACHYMETRY
OS_CT_CORRECTION: -4
OS_CT_UM: 606

## 2023-09-25 ENCOUNTER — OFFICE (OUTPATIENT)
Dept: URBAN - METROPOLITAN AREA CLINIC 116 | Facility: CLINIC | Age: 73
Setting detail: OPHTHALMOLOGY
End: 2023-09-25
Payer: MEDICAID

## 2023-09-25 DIAGNOSIS — H25.11: ICD-10-CM

## 2023-09-25 DIAGNOSIS — H35.3132: ICD-10-CM

## 2023-09-25 DIAGNOSIS — E11.3312: ICD-10-CM

## 2023-09-25 DIAGNOSIS — H25.13: ICD-10-CM

## 2023-09-25 DIAGNOSIS — H25.12: ICD-10-CM

## 2023-09-25 DIAGNOSIS — E11.3311: ICD-10-CM

## 2023-09-25 PROCEDURE — 92250 FUNDUS PHOTOGRAPHY W/I&R: CPT | Performed by: OPTOMETRIST

## 2023-09-25 PROCEDURE — 92014 COMPRE OPH EXAM EST PT 1/>: CPT | Performed by: OPTOMETRIST

## 2023-09-25 ASSESSMENT — REFRACTION_CURRENTRX
OD_SPHERE: +1.50
OD_OVR_VA: 20/
OS_SPHERE: +1.50
OS_OVR_VA: 20/

## 2023-09-25 ASSESSMENT — SPHEQUIV_DERIVED
OS_SPHEQUIV: 0.875
OD_SPHEQUIV: 0.5
OD_SPHEQUIV: 0.875
OS_SPHEQUIV: 0.5

## 2023-09-25 ASSESSMENT — REFRACTION_MANIFEST
OS_VA2: 20/200
OD_ADD: +2.75
OD_AXIS: 090
OS_SPHERE: +1.50
OS_CYLINDER: -2.00
OD_VA2: 20/200
OD_SPHERE: +1.50
OS_AXIS: 085
OS_VA1: 20/140
OD_VA1: 20/140
OD_CYLINDER: -2.00
OU_VA: 20/140
OS_ADD: +2.75

## 2023-09-25 ASSESSMENT — REFRACTION_AUTOREFRACTION
OD_SPHERE: +2.50
OS_AXIS: 083
OD_CYLINDER: -3.25
OS_CYLINDER: -3.25
OS_SPHERE: +2.50
OD_AXIS: -94

## 2023-09-25 ASSESSMENT — VISUAL ACUITY
OS_BCVA: 20/150
OD_BCVA: 20/100

## 2023-09-25 ASSESSMENT — PACHYMETRY
OS_CT_CORRECTION: -4
OS_CT_UM: 606

## 2023-09-25 ASSESSMENT — LID EXAM ASSESSMENTS
OD_COMMENTS: PROMINENT NASAL MIDDLE AND TEMPORAL FAT PADS BOTH  LOWER LIDS
OD_ECTROPION: 1+
OS_CENTRAL_FAT_PROLAPSE: 3+
OD_LEVATOR_FUNCTION: POOR/VARIABLE 7 MM
OS_COMMENTS: 3+ MEDIAL FAT PROLAPSE
OS_MRD2: 5 MM
OD_CENTRAL_FAT_PROLAPSE: 3+
OS_ECTROPION: 1+
OD_LID_CREASE: 12 HIGH
OD_COMMENTS: 3+ CENTRAL FAT PROLAPS
OD_MEDIAL_FAT_PROLAPSE: 2+
OS_COMMENTS: PROMINENT NASAL MIDDLE AND TEMPORAL FAT PADS BOTH  LOWER LIDS
OS_COMMENTS: 3+ CENTRAL FAT PROLAPS
OD_MEDIAL_FAT_PROLAPSE: 3+
OS_MEDIAL_FAT_PROLAPSE: 2+
OS_LID_CREASE: 12 HIGH
OS_LEVATOR_FUNCTION: POOR/VARIABLE 7 MM
OD_MRD2: 5 MM

## 2023-09-25 ASSESSMENT — LID POSITION - COMMENTS
OS_COMMENTS: LEVATOR DEHISANCE.  MRD 1: 0 IPD:  5ULE:&GT
OD_COMMENTS: LEVATOR DEHISANCE.  MRD 1: 0 IPD:  5ULE:&GT
OS_COMMENTS: 10 BROW PTOSIS 5MMTHIN SKIN, LID CREASE HIGH 3/7/17
OD_COMMENTS: 10 BROW PTOSIS 5MMTHIN SKIN, LID CREASE HIGH 3/7/17

## 2023-09-25 ASSESSMENT — KERATOMETRY
OS_K2POWER_DIOPTERS: 44.50
OS_K1POWER_DIOPTERS: 42.25
OS_AXISANGLE_DEGREES: 177
METHOD_AUTO_MANUAL: AUTO

## 2023-09-25 ASSESSMENT — AXIALLENGTH_DERIVED
OS_AL: 23.3006
OS_AL: 23.4439

## 2023-09-25 ASSESSMENT — CONFRONTATIONAL VISUAL FIELD TEST (CVF)
OS_FINDINGS: FULL
OD_FINDINGS: FULL

## 2023-09-25 ASSESSMENT — TONOMETRY
OD_IOP_MMHG: 20
OS_IOP_MMHG: 18

## 2023-09-25 ASSESSMENT — SUPERFICIAL PUNCTATE KERATITIS (SPK)
OD_SPK: 1+
OS_SPK: 1+

## 2023-11-02 ENCOUNTER — OFFICE (OUTPATIENT)
Dept: URBAN - METROPOLITAN AREA CLINIC 94 | Facility: CLINIC | Age: 73
Setting detail: OPHTHALMOLOGY
End: 2023-11-02
Payer: MEDICAID

## 2023-11-02 DIAGNOSIS — H40.013: ICD-10-CM

## 2023-11-02 DIAGNOSIS — E11.3312: ICD-10-CM

## 2023-11-02 DIAGNOSIS — H25.13: ICD-10-CM

## 2023-11-02 DIAGNOSIS — E11.3311: ICD-10-CM

## 2023-11-02 PROCEDURE — 92012 INTRM OPH EXAM EST PATIENT: CPT | Performed by: OPHTHALMOLOGY

## 2023-11-02 PROCEDURE — 92134 CPTRZ OPH DX IMG PST SGM RTA: CPT | Performed by: OPHTHALMOLOGY

## 2023-11-02 ASSESSMENT — SPHEQUIV_DERIVED
OD_SPHEQUIV: 0.5
OS_SPHEQUIV: 1.375
OD_SPHEQUIV: 1.25
OS_SPHEQUIV: 0.5

## 2023-11-02 ASSESSMENT — REFRACTION_CURRENTRX
OD_SPHERE: +1.50
OD_AXIS: 095
OD_CYLINDER: -2.00
OD_OVR_VA: 20/
OD_OVR_VA: 20/
OD_SPHERE: +1.50
OS_SPHERE: +1.50
OS_ADD: +2.75
OS_SPHERE: +1.50
OS_OVR_VA: 20/
OS_OVR_VA: 20/
OD_ADD: +2.75
OS_AXIS: 080
OS_CYLINDER: -2.00

## 2023-11-02 ASSESSMENT — REFRACTION_AUTOREFRACTION
OS_SPHERE: +3.00
OD_SPHERE: +2.75
OS_AXIS: 075
OS_CYLINDER: -3.25
OD_AXIS: 094
OD_CYLINDER: -3.00

## 2023-11-02 ASSESSMENT — LID EXAM ASSESSMENTS
OS_LEVATOR_FUNCTION: POOR/VARIABLE 7 MM
OD_MRD2: 5 MM
OS_MRD2: 5 MM
OD_COMMENTS: 3+ CENTRAL FAT PROLAPS
OS_MEDIAL_FAT_PROLAPSE: 2+
OS_COMMENTS: PROMINENT NASAL MIDDLE AND TEMPORAL FAT PADS BOTH  LOWER LIDS
OD_COMMENTS: PROMINENT NASAL MIDDLE AND TEMPORAL FAT PADS BOTH  LOWER LIDS
OD_CENTRAL_FAT_PROLAPSE: 3+
OS_LID_CREASE: 12 HIGH
OD_MEDIAL_FAT_PROLAPSE: 3+
OD_LID_CREASE: 12 HIGH
OD_MEDIAL_FAT_PROLAPSE: 2+
OS_CENTRAL_FAT_PROLAPSE: 3+
OD_LEVATOR_FUNCTION: POOR/VARIABLE 7 MM
OS_ECTROPION: 1+
OS_COMMENTS: 3+ CENTRAL FAT PROLAPS
OD_ECTROPION: 1+
OS_COMMENTS: 3+ MEDIAL FAT PROLAPSE

## 2023-11-02 ASSESSMENT — REFRACTION_MANIFEST
OD_SPHERE: +1.50
OS_ADD: +2.75
OD_ADD: +2.75
OD_CYLINDER: -2.00
OD_VA1: 20/140
OS_AXIS: 085
OS_VA2: 20/200
OD_AXIS: 090
OS_VA1: 20/140
OD_VA2: 20/200
OU_VA: 20/140
OS_CYLINDER: -2.00
OS_SPHERE: +1.50

## 2023-11-02 ASSESSMENT — LID POSITION - COMMENTS
OD_COMMENTS: 10 BROW PTOSIS 5MMTHIN SKIN, LID CREASE HIGH 3/7/17
OS_COMMENTS: LEVATOR DEHISANCE.  MRD 1: 0 IPD:  5ULE:&GT
OD_COMMENTS: LEVATOR DEHISANCE.  MRD 1: 0 IPD:  5ULE:&GT
OS_COMMENTS: 10 BROW PTOSIS 5MMTHIN SKIN, LID CREASE HIGH 3/7/17

## 2023-11-02 ASSESSMENT — SUPERFICIAL PUNCTATE KERATITIS (SPK)
OD_SPK: 1+
OS_SPK: 1+

## 2023-11-02 ASSESSMENT — CONFRONTATIONAL VISUAL FIELD TEST (CVF)
OD_FINDINGS: FULL
OS_FINDINGS: FULL

## 2023-11-07 ENCOUNTER — OFFICE (OUTPATIENT)
Dept: URBAN - METROPOLITAN AREA CLINIC 94 | Facility: CLINIC | Age: 73
Setting detail: OPHTHALMOLOGY
End: 2023-11-07
Payer: MEDICAID

## 2023-11-07 DIAGNOSIS — E11.3313: ICD-10-CM

## 2023-11-07 DIAGNOSIS — E11.3312: ICD-10-CM

## 2023-11-07 DIAGNOSIS — H35.3132: ICD-10-CM

## 2023-11-07 PROCEDURE — 92012 INTRM OPH EXAM EST PATIENT: CPT | Mod: 57 | Performed by: OPHTHALMOLOGY

## 2023-11-07 PROCEDURE — 92134 CPTRZ OPH DX IMG PST SGM RTA: CPT | Performed by: OPHTHALMOLOGY

## 2023-11-07 PROCEDURE — 67210 TREATMENT OF RETINAL LESION: CPT | Mod: LT | Performed by: OPHTHALMOLOGY

## 2023-11-07 ASSESSMENT — LID POSITION - COMMENTS
OS_COMMENTS: LEVATOR DEHISANCE.  MRD 1: 0 IPD:  5ULE:&GT
OS_COMMENTS: 10 BROW PTOSIS 5MMTHIN SKIN, LID CREASE HIGH 3/7/17
OD_COMMENTS: LEVATOR DEHISANCE.  MRD 1: 0 IPD:  5ULE:&GT
OD_COMMENTS: 10 BROW PTOSIS 5MMTHIN SKIN, LID CREASE HIGH 3/7/17

## 2023-11-07 ASSESSMENT — REFRACTION_CURRENTRX
OS_ADD: +2.75
OD_OVR_VA: 20/
OD_CYLINDER: -2.00
OD_OVR_VA: 20/
OS_OVR_VA: 20/
OS_SPHERE: +1.50
OD_SPHERE: +1.50
OD_AXIS: 095
OS_OVR_VA: 20/
OS_AXIS: 080
OS_CYLINDER: -2.00
OD_ADD: +2.75
OS_SPHERE: +1.50
OD_SPHERE: +1.50

## 2023-11-07 ASSESSMENT — LID EXAM ASSESSMENTS
OS_MEDIAL_FAT_PROLAPSE: 2+
OD_ECTROPION: 1+
OS_COMMENTS: PROMINENT NASAL MIDDLE AND TEMPORAL FAT PADS BOTH  LOWER LIDS
OD_MEDIAL_FAT_PROLAPSE: 2+
OS_COMMENTS: 3+ CENTRAL FAT PROLAPS
OS_ECTROPION: 1+
OD_MRD2: 5 MM
OS_CENTRAL_FAT_PROLAPSE: 3+
OS_LID_CREASE: 12 HIGH
OS_LEVATOR_FUNCTION: POOR/VARIABLE 7 MM
OD_COMMENTS: 3+ CENTRAL FAT PROLAPS
OS_COMMENTS: 3+ MEDIAL FAT PROLAPSE
OS_MRD2: 5 MM
OD_CENTRAL_FAT_PROLAPSE: 3+
OD_LID_CREASE: 12 HIGH
OD_MEDIAL_FAT_PROLAPSE: 3+
OD_LEVATOR_FUNCTION: POOR/VARIABLE 7 MM
OD_COMMENTS: PROMINENT NASAL MIDDLE AND TEMPORAL FAT PADS BOTH  LOWER LIDS

## 2023-11-07 ASSESSMENT — REFRACTION_AUTOREFRACTION
OS_AXIS: 075
OS_SPHERE: +3.00
OS_CYLINDER: -3.25
OD_CYLINDER: -3.00
OD_AXIS: 094
OD_SPHERE: +2.75

## 2023-11-07 ASSESSMENT — SPHEQUIV_DERIVED
OS_SPHEQUIV: 1.375
OD_SPHEQUIV: 1.25

## 2023-11-07 ASSESSMENT — SUPERFICIAL PUNCTATE KERATITIS (SPK)
OS_SPK: 1+
OD_SPK: 1+

## 2023-11-07 ASSESSMENT — CONFRONTATIONAL VISUAL FIELD TEST (CVF)
OS_FINDINGS: FULL
OD_FINDINGS: FULL

## 2023-11-28 ENCOUNTER — OFFICE (OUTPATIENT)
Dept: URBAN - METROPOLITAN AREA CLINIC 94 | Facility: CLINIC | Age: 73
Setting detail: OPHTHALMOLOGY
End: 2023-11-28
Payer: MEDICAID

## 2023-11-28 DIAGNOSIS — E11.3311: ICD-10-CM

## 2023-11-28 DIAGNOSIS — H35.3132: ICD-10-CM

## 2023-11-28 DIAGNOSIS — E11.3313: ICD-10-CM

## 2023-11-28 PROBLEM — H16.223 DRY EYE SYNDROME K SICCA; BOTH EYES: Status: ACTIVE | Noted: 2023-11-02

## 2023-11-28 PROBLEM — H02.13 ECTROPION-SENILE; RIGHT LOWER LID, LEFT LOWER LID: Status: ACTIVE | Noted: 2023-11-02

## 2023-11-28 PROCEDURE — 67210 TREATMENT OF RETINAL LESION: CPT | Mod: 79,RT | Performed by: OPHTHALMOLOGY

## 2023-11-28 PROCEDURE — 92134 CPTRZ OPH DX IMG PST SGM RTA: CPT | Performed by: OPHTHALMOLOGY

## 2023-11-28 ASSESSMENT — LID EXAM ASSESSMENTS
OD_LID_CREASE: 12 HIGH
OS_LEVATOR_FUNCTION: POOR/VARIABLE 7 MM
OD_ECTROPION: 1+
OD_CENTRAL_FAT_PROLAPSE: 3+
OS_MRD2: 5 MM
OS_CENTRAL_FAT_PROLAPSE: 3+
OS_COMMENTS: 3+ MEDIAL FAT PROLAPSE
OD_COMMENTS: 3+ CENTRAL FAT PROLAPS
OD_LEVATOR_FUNCTION: POOR/VARIABLE 7 MM
OD_MEDIAL_FAT_PROLAPSE: 3+
OD_MEDIAL_FAT_PROLAPSE: 2+
OD_COMMENTS: PROMINENT NASAL MIDDLE AND TEMPORAL FAT PADS BOTH  LOWER LIDS
OD_MRD2: 5 MM
OS_COMMENTS: PROMINENT NASAL MIDDLE AND TEMPORAL FAT PADS BOTH  LOWER LIDS
OS_COMMENTS: 3+ CENTRAL FAT PROLAPS
OS_LID_CREASE: 12 HIGH
OS_MEDIAL_FAT_PROLAPSE: 2+
OS_ECTROPION: 1+

## 2023-11-28 ASSESSMENT — REFRACTION_AUTOREFRACTION
OD_CYLINDER: -3.00
OS_AXIS: 075
OS_CYLINDER: -3.25
OS_SPHERE: +3.00
OD_AXIS: 095
OD_SPHERE: +2.75

## 2023-11-28 ASSESSMENT — LID POSITION - COMMENTS
OD_COMMENTS: 10 BROW PTOSIS 5MMTHIN SKIN, LID CREASE HIGH 3/7/17
OD_COMMENTS: LEVATOR DEHISANCE.  MRD 1: 0 IPD:  5ULE:&GT
OS_COMMENTS: LEVATOR DEHISANCE.  MRD 1: 0 IPD:  5ULE:&GT
OS_COMMENTS: 10 BROW PTOSIS 5MMTHIN SKIN, LID CREASE HIGH 3/7/17

## 2023-11-28 ASSESSMENT — SUPERFICIAL PUNCTATE KERATITIS (SPK)
OD_SPK: 1+
OS_SPK: 1+

## 2023-11-28 ASSESSMENT — REFRACTION_CURRENTRX
OD_SPHERE: +1.50
OS_ADD: +2.75
OS_SPHERE: +1.50
OD_CYLINDER: -2.00
OD_ADD: +2.75
OD_SPHERE: +1.50
OS_SPHERE: +1.50
OS_AXIS: 080
OS_OVR_VA: 20/
OS_CYLINDER: -2.00
OD_AXIS: 095
OD_OVR_VA: 20/
OS_OVR_VA: 20/
OD_OVR_VA: 20/

## 2023-11-28 ASSESSMENT — SPHEQUIV_DERIVED
OS_SPHEQUIV: 1.375
OD_SPHEQUIV: 1.25

## 2023-11-28 ASSESSMENT — CONFRONTATIONAL VISUAL FIELD TEST (CVF)
OD_FINDINGS: FULL
OS_FINDINGS: FULL

## 2023-12-07 ENCOUNTER — OFFICE (OUTPATIENT)
Dept: URBAN - METROPOLITAN AREA CLINIC 115 | Facility: CLINIC | Age: 73
Setting detail: OPHTHALMOLOGY
End: 2023-12-07
Payer: MEDICAID

## 2023-12-07 DIAGNOSIS — E11.3312: ICD-10-CM

## 2023-12-07 DIAGNOSIS — H40.013: ICD-10-CM

## 2023-12-07 DIAGNOSIS — H25.13: ICD-10-CM

## 2023-12-07 DIAGNOSIS — E11.3311: ICD-10-CM

## 2023-12-07 DIAGNOSIS — H35.3132: ICD-10-CM

## 2023-12-07 PROCEDURE — 99024 POSTOP FOLLOW-UP VISIT: CPT | Performed by: OPTOMETRIST

## 2023-12-07 ASSESSMENT — REFRACTION_CURRENTRX
OD_AXIS: 095
OS_ADD: +2.75
OD_ADD: +2.75
OD_CYLINDER: -2.00
OS_OVR_VA: 20/
OS_SPHERE: +1.50
OS_AXIS: 080
OD_OVR_VA: 20/
OS_SPHERE: +1.50
OD_SPHERE: +1.50
OS_OVR_VA: 20/
OS_CYLINDER: -2.00
OD_OVR_VA: 20/
OD_SPHERE: +1.50

## 2023-12-07 ASSESSMENT — SUPERFICIAL PUNCTATE KERATITIS (SPK)
OD_SPK: 1+
OS_SPK: 1+

## 2023-12-07 ASSESSMENT — LID EXAM ASSESSMENTS
OS_COMMENTS: 3+ MEDIAL FAT PROLAPSE
OS_LEVATOR_FUNCTION: POOR/VARIABLE 7 MM
OS_COMMENTS: PROMINENT NASAL MIDDLE AND TEMPORAL FAT PADS BOTH  LOWER LIDS
OD_LEVATOR_FUNCTION: POOR/VARIABLE 7 MM
OS_LID_CREASE: 12 HIGH
OD_MEDIAL_FAT_PROLAPSE: 3+
OS_COMMENTS: 3+ CENTRAL FAT PROLAPS
OD_LID_CREASE: 12 HIGH
OD_ECTROPION: 1+
OD_MEDIAL_FAT_PROLAPSE: 2+
OS_ECTROPION: 1+
OS_CENTRAL_FAT_PROLAPSE: 3+
OD_COMMENTS: 3+ CENTRAL FAT PROLAPS
OD_CENTRAL_FAT_PROLAPSE: 3+
OD_COMMENTS: PROMINENT NASAL MIDDLE AND TEMPORAL FAT PADS BOTH  LOWER LIDS
OS_MEDIAL_FAT_PROLAPSE: 2+
OD_MRD2: 5 MM
OS_MRD2: 5 MM

## 2023-12-07 ASSESSMENT — LID POSITION - COMMENTS
OD_COMMENTS: LEVATOR DEHISANCE.  MRD 1: 0 IPD:  5ULE:&GT
OS_COMMENTS: 10 BROW PTOSIS 5MMTHIN SKIN, LID CREASE HIGH 3/7/17
OD_COMMENTS: 10 BROW PTOSIS 5MMTHIN SKIN, LID CREASE HIGH 3/7/17
OS_COMMENTS: LEVATOR DEHISANCE.  MRD 1: 0 IPD:  5ULE:&GT

## 2023-12-07 ASSESSMENT — REFRACTION_AUTOREFRACTION
OD_AXIS: 095
OS_SPHERE: UTP
OD_CYLINDER: -3.00
OD_SPHERE: UTP
OS_AXIS: 075
OS_CYLINDER: -3.25

## 2023-12-12 ENCOUNTER — OFFICE (OUTPATIENT)
Dept: URBAN - METROPOLITAN AREA CLINIC 94 | Facility: CLINIC | Age: 73
Setting detail: OPHTHALMOLOGY
End: 2023-12-12
Payer: MEDICAID

## 2023-12-12 DIAGNOSIS — H35.3132: ICD-10-CM

## 2023-12-12 DIAGNOSIS — E11.3313: ICD-10-CM

## 2023-12-12 PROCEDURE — 67028 INJECTION EYE DRUG: CPT | Mod: 58,50 | Performed by: OPHTHALMOLOGY

## 2023-12-12 PROCEDURE — 92235 FLUORESCEIN ANGRPH MLTIFRAME: CPT | Performed by: OPHTHALMOLOGY

## 2023-12-12 PROCEDURE — 92134 CPTRZ OPH DX IMG PST SGM RTA: CPT | Performed by: OPHTHALMOLOGY

## 2023-12-12 ASSESSMENT — LID EXAM ASSESSMENTS
OD_CENTRAL_FAT_PROLAPSE: 3+
OS_ECTROPION: 1+
OS_LID_CREASE: 12 HIGH
OS_CENTRAL_FAT_PROLAPSE: 3+
OD_ECTROPION: 1+
OD_MRD2: 5 MM
OS_COMMENTS: PROMINENT NASAL MIDDLE AND TEMPORAL FAT PADS BOTH  LOWER LIDS
OD_COMMENTS: 3+ CENTRAL FAT PROLAPS
OD_LEVATOR_FUNCTION: POOR/VARIABLE 7 MM
OD_MEDIAL_FAT_PROLAPSE: 2+
OD_COMMENTS: PROMINENT NASAL MIDDLE AND TEMPORAL FAT PADS BOTH  LOWER LIDS
OS_COMMENTS: 3+ MEDIAL FAT PROLAPSE
OS_MRD2: 5 MM
OS_MEDIAL_FAT_PROLAPSE: 2+
OD_LID_CREASE: 12 HIGH
OS_COMMENTS: 3+ CENTRAL FAT PROLAPS
OS_LEVATOR_FUNCTION: POOR/VARIABLE 7 MM
OD_MEDIAL_FAT_PROLAPSE: 3+

## 2023-12-12 ASSESSMENT — REFRACTION_AUTOREFRACTION
OD_CYLINDER: -3.00
OS_SPHERE: UTP
OS_CYLINDER: -3.25
OD_SPHERE: UTP
OD_AXIS: 095
OS_AXIS: 075

## 2023-12-12 ASSESSMENT — SUPERFICIAL PUNCTATE KERATITIS (SPK)
OD_SPK: 1+
OS_SPK: 1+

## 2023-12-12 ASSESSMENT — CONFRONTATIONAL VISUAL FIELD TEST (CVF)
OD_FINDINGS: FULL
OS_FINDINGS: FULL

## 2024-01-09 ENCOUNTER — ASC (OUTPATIENT)
Dept: URBAN - METROPOLITAN AREA SURGERY 8 | Facility: SURGERY | Age: 74
Setting detail: OPHTHALMOLOGY
End: 2024-01-09
Payer: MEDICARE

## 2024-01-09 DIAGNOSIS — E11.3311: ICD-10-CM

## 2024-01-09 PROCEDURE — 67228 TREATMENT X10SV RETINOPATHY: CPT | Mod: 79,RT | Performed by: OPHTHALMOLOGY

## 2024-01-09 ASSESSMENT — LID EXAM ASSESSMENTS
OD_ECTROPION: 1+
OS_ECTROPION: 1+
OS_LID_CREASE: 12 HIGH
OD_COMMENTS: PROMINENT NASAL MIDDLE AND TEMPORAL FAT PADS BOTH  LOWER LIDS
OS_MRD2: 5 MM
OD_COMMENTS: 3+ CENTRAL FAT PROLAPS
OD_MEDIAL_FAT_PROLAPSE: 2+
OD_MRD2: 5 MM
OS_CENTRAL_FAT_PROLAPSE: 3+
OD_CENTRAL_FAT_PROLAPSE: 3+
OS_COMMENTS: 3+ MEDIAL FAT PROLAPSE
OS_LEVATOR_FUNCTION: POOR/VARIABLE 7 MM
OS_MEDIAL_FAT_PROLAPSE: 2+
OD_LID_CREASE: 12 HIGH
OS_COMMENTS: 3+ CENTRAL FAT PROLAPS
OD_MEDIAL_FAT_PROLAPSE: 3+
OS_COMMENTS: PROMINENT NASAL MIDDLE AND TEMPORAL FAT PADS BOTH  LOWER LIDS
OD_LEVATOR_FUNCTION: POOR/VARIABLE 7 MM

## 2024-01-09 ASSESSMENT — REFRACTION_AUTOREFRACTION
OS_CYLINDER: -3.25
OD_AXIS: 095
OS_AXIS: 075
OD_CYLINDER: -3.00
OS_SPHERE: UTP
OD_SPHERE: UTP

## 2024-01-09 ASSESSMENT — CONFRONTATIONAL VISUAL FIELD TEST (CVF)
OD_FINDINGS: FULL
OS_FINDINGS: FULL

## 2024-01-09 ASSESSMENT — SUPERFICIAL PUNCTATE KERATITIS (SPK)
OD_SPK: 1+
OS_SPK: 1+

## 2024-01-16 ENCOUNTER — ASC (OUTPATIENT)
Dept: URBAN - METROPOLITAN AREA SURGERY 8 | Facility: SURGERY | Age: 74
Setting detail: OPHTHALMOLOGY
End: 2024-01-16
Payer: MEDICARE

## 2024-01-16 DIAGNOSIS — E11.3312: ICD-10-CM

## 2024-01-16 PROCEDURE — 67228 TREATMENT X10SV RETINOPATHY: CPT | Mod: 79,LT | Performed by: OPHTHALMOLOGY

## 2024-01-16 ASSESSMENT — LID EXAM ASSESSMENTS
OD_COMMENTS: PROMINENT NASAL MIDDLE AND TEMPORAL FAT PADS BOTH  LOWER LIDS
OD_CENTRAL_FAT_PROLAPSE: 3+
OS_LID_CREASE: 12 HIGH
OS_COMMENTS: 3+ CENTRAL FAT PROLAPS
OS_COMMENTS: 3+ MEDIAL FAT PROLAPSE
OS_CENTRAL_FAT_PROLAPSE: 3+
OS_MEDIAL_FAT_PROLAPSE: 2+
OS_LEVATOR_FUNCTION: POOR/VARIABLE 7 MM
OD_ECTROPION: 1+
OD_MRD2: 5 MM
OD_LID_CREASE: 12 HIGH
OS_MRD2: 5 MM
OS_COMMENTS: PROMINENT NASAL MIDDLE AND TEMPORAL FAT PADS BOTH  LOWER LIDS
OD_LEVATOR_FUNCTION: POOR/VARIABLE 7 MM
OD_MEDIAL_FAT_PROLAPSE: 3+
OD_MEDIAL_FAT_PROLAPSE: 2+
OS_ECTROPION: 1+
OD_COMMENTS: 3+ CENTRAL FAT PROLAPS

## 2024-01-16 ASSESSMENT — SUPERFICIAL PUNCTATE KERATITIS (SPK)
OD_SPK: 1+
OS_SPK: 1+

## 2024-01-16 ASSESSMENT — CONFRONTATIONAL VISUAL FIELD TEST (CVF)
OD_FINDINGS: FULL
OS_FINDINGS: FULL

## 2024-01-24 ASSESSMENT — REFRACTION_AUTOREFRACTION
OS_CYLINDER: -3.25
OS_SPHERE: UTP
OD_SPHERE: UTP
OD_CYLINDER: -3.00
OD_AXIS: 095
OS_AXIS: 075

## 2024-02-07 ENCOUNTER — OFFICE (OUTPATIENT)
Dept: URBAN - METROPOLITAN AREA CLINIC 94 | Facility: CLINIC | Age: 74
Setting detail: OPHTHALMOLOGY
End: 2024-02-07
Payer: MEDICARE

## 2024-02-07 DIAGNOSIS — H35.3132: ICD-10-CM

## 2024-02-07 DIAGNOSIS — E11.3312: ICD-10-CM

## 2024-02-07 DIAGNOSIS — E11.3311: ICD-10-CM

## 2024-02-07 PROCEDURE — 67028 INJECTION EYE DRUG: CPT | Mod: 50 | Performed by: OPHTHALMOLOGY

## 2024-02-07 PROCEDURE — 92134 CPTRZ OPH DX IMG PST SGM RTA: CPT | Performed by: OPHTHALMOLOGY

## 2024-02-07 ASSESSMENT — LID EXAM ASSESSMENTS
OS_COMMENTS: 3+ MEDIAL FAT PROLAPSE
OS_LEVATOR_FUNCTION: POOR/VARIABLE 7 MM
OS_ECTROPION: 1+
OD_MRD2: 5 MM
OS_COMMENTS: PROMINENT NASAL MIDDLE AND TEMPORAL FAT PADS BOTH  LOWER LIDS
OD_MEDIAL_FAT_PROLAPSE: 3+
OS_MEDIAL_FAT_PROLAPSE: 2+
OS_LID_CREASE: 12 HIGH
OD_COMMENTS: PROMINENT NASAL MIDDLE AND TEMPORAL FAT PADS BOTH  LOWER LIDS
OD_ECTROPION: 1+
OD_LEVATOR_FUNCTION: POOR/VARIABLE 7 MM
OS_MRD2: 5 MM
OD_CENTRAL_FAT_PROLAPSE: 3+
OS_COMMENTS: 3+ CENTRAL FAT PROLAPS
OD_MEDIAL_FAT_PROLAPSE: 2+
OS_CENTRAL_FAT_PROLAPSE: 3+
OD_LID_CREASE: 12 HIGH
OD_COMMENTS: 3+ CENTRAL FAT PROLAPS

## 2024-02-07 ASSESSMENT — REFRACTION_AUTOREFRACTION
OD_AXIS: 095
OS_CYLINDER: -3.25
OS_AXIS: 075
OS_SPHERE: UTP
OD_CYLINDER: -3.00
OD_SPHERE: UTP

## 2024-02-07 ASSESSMENT — LID POSITION - COMMENTS
OD_COMMENTS: LEVATOR DEHISANCE.  MRD 1: 0 IPD:  5ULE:&GT
OD_COMMENTS: 10 BROW PTOSIS 5MMTHIN SKIN, LID CREASE HIGH 3/7/17
OS_COMMENTS: LEVATOR DEHISANCE.  MRD 1: 0 IPD:  5ULE:&GT
OS_COMMENTS: 10 BROW PTOSIS 5MMTHIN SKIN, LID CREASE HIGH 3/7/17

## 2024-02-07 ASSESSMENT — SUPERFICIAL PUNCTATE KERATITIS (SPK)
OS_SPK: 1+
OD_SPK: 1+

## 2024-02-07 ASSESSMENT — CONFRONTATIONAL VISUAL FIELD TEST (CVF)
OD_FINDINGS: FULL
OS_FINDINGS: FULL

## 2024-03-08 ENCOUNTER — OFFICE (OUTPATIENT)
Dept: URBAN - METROPOLITAN AREA CLINIC 116 | Facility: CLINIC | Age: 74
Setting detail: OPHTHALMOLOGY
End: 2024-03-08
Payer: MEDICARE

## 2024-03-08 DIAGNOSIS — E11.3311: ICD-10-CM

## 2024-03-08 DIAGNOSIS — H52.4: ICD-10-CM

## 2024-03-08 DIAGNOSIS — E11.3312: ICD-10-CM

## 2024-03-08 DIAGNOSIS — H25.13: ICD-10-CM

## 2024-03-08 PROCEDURE — 99213 OFFICE O/P EST LOW 20 MIN: CPT | Performed by: OPTOMETRIST

## 2024-03-08 PROCEDURE — 92015 DETERMINE REFRACTIVE STATE: CPT | Performed by: OPTOMETRIST

## 2024-03-08 ASSESSMENT — REFRACTION_MANIFEST
OS_SPHERE: +1.50
OD_SPHERE: +1.50
OU_VA: 20/80
OS_AXIS: 085
OS_ADD: +2.75
OD_ADD: +2.75
OS_CYLINDER: -2.00
OD_VA1: 20/80
OD_AXIS: 090
OD_CYLINDER: -2.00
OS_VA1: 20/80

## 2024-03-08 ASSESSMENT — LID POSITION - COMMENTS
OD_COMMENTS: LEVATOR DEHISANCE.  MRD 1: 0 IPD:  5ULE:&GT
OS_COMMENTS: 10 BROW PTOSIS 5MMTHIN SKIN, LID CREASE HIGH 3/7/17
OS_COMMENTS: LEVATOR DEHISANCE.  MRD 1: 0 IPD:  5ULE:&GT
OD_COMMENTS: 10 BROW PTOSIS 5MMTHIN SKIN, LID CREASE HIGH 3/7/17

## 2024-03-08 ASSESSMENT — LID EXAM ASSESSMENTS
OD_CENTRAL_FAT_PROLAPSE: 3+
OD_COMMENTS: PROMINENT NASAL MIDDLE AND TEMPORAL FAT PADS BOTH  LOWER LIDS
OS_COMMENTS: 3+ MEDIAL FAT PROLAPSE
OS_ECTROPION: 1+
OD_LEVATOR_FUNCTION: POOR/VARIABLE 7 MM
OD_MRD2: 5 MM
OS_LID_CREASE: 12 HIGH
OS_MEDIAL_FAT_PROLAPSE: 2+
OD_COMMENTS: 3+ CENTRAL FAT PROLAPS
OD_ECTROPION: 1+
OS_COMMENTS: PROMINENT NASAL MIDDLE AND TEMPORAL FAT PADS BOTH  LOWER LIDS
OS_COMMENTS: 3+ CENTRAL FAT PROLAPS
OS_MRD2: 5 MM
OS_LEVATOR_FUNCTION: POOR/VARIABLE 7 MM
OS_CENTRAL_FAT_PROLAPSE: 3+
OD_MEDIAL_FAT_PROLAPSE: 2+
OD_MEDIAL_FAT_PROLAPSE: 3+
OD_LID_CREASE: 12 HIGH

## 2024-03-08 ASSESSMENT — SPHEQUIV_DERIVED
OD_SPHEQUIV: 0.5
OS_SPHEQUIV: 0.5

## 2025-02-20 ENCOUNTER — NON-APPOINTMENT (OUTPATIENT)
Age: 75
End: 2025-02-20

## 2025-02-27 ENCOUNTER — APPOINTMENT (OUTPATIENT)
Dept: OTOLARYNGOLOGY | Facility: CLINIC | Age: 75
End: 2025-02-27
Payer: MEDICARE

## 2025-02-27 VITALS
BODY MASS INDEX: 19.35 KG/M2 | HEIGHT: 59 IN | DIASTOLIC BLOOD PRESSURE: 72 MMHG | HEART RATE: 79 BPM | SYSTOLIC BLOOD PRESSURE: 130 MMHG | WEIGHT: 96 LBS

## 2025-02-27 DIAGNOSIS — I10 ESSENTIAL (PRIMARY) HYPERTENSION: ICD-10-CM

## 2025-02-27 DIAGNOSIS — E78.5 HYPERLIPIDEMIA, UNSPECIFIED: ICD-10-CM

## 2025-02-27 DIAGNOSIS — E11.9 TYPE 2 DIABETES MELLITUS W/OUT COMPLICATIONS: ICD-10-CM

## 2025-02-27 PROBLEM — C02.9 MALIGNANT NEOPLASM OF TONGUE: Status: ACTIVE | Noted: 2025-02-27

## 2025-02-27 PROCEDURE — 99205 OFFICE O/P NEW HI 60 MIN: CPT

## 2025-03-05 ENCOUNTER — APPOINTMENT (OUTPATIENT)
Dept: CT IMAGING | Facility: CLINIC | Age: 75
End: 2025-03-05

## 2025-03-05 ENCOUNTER — OUTPATIENT (OUTPATIENT)
Dept: OUTPATIENT SERVICES | Facility: HOSPITAL | Age: 75
LOS: 1 days | End: 2025-03-05

## 2025-03-05 DIAGNOSIS — Z00.8 ENCOUNTER FOR OTHER GENERAL EXAMINATION: ICD-10-CM

## 2025-03-06 ENCOUNTER — OUTPATIENT (OUTPATIENT)
Dept: OUTPATIENT SERVICES | Facility: HOSPITAL | Age: 75
LOS: 1 days | End: 2025-03-06
Payer: MEDICARE

## 2025-03-06 ENCOUNTER — APPOINTMENT (OUTPATIENT)
Dept: OTOLARYNGOLOGY | Facility: CLINIC | Age: 75
End: 2025-03-06
Payer: MEDICARE

## 2025-03-06 VITALS
OXYGEN SATURATION: 97 % | DIASTOLIC BLOOD PRESSURE: 69 MMHG | TEMPERATURE: 98 F | SYSTOLIC BLOOD PRESSURE: 138 MMHG | HEART RATE: 75 BPM | RESPIRATION RATE: 18 BRPM | HEIGHT: 57 IN | WEIGHT: 97 LBS

## 2025-03-06 VITALS
HEART RATE: 83 BPM | SYSTOLIC BLOOD PRESSURE: 148 MMHG | WEIGHT: 96 LBS | HEIGHT: 59 IN | DIASTOLIC BLOOD PRESSURE: 59 MMHG | BODY MASS INDEX: 19.35 KG/M2

## 2025-03-06 DIAGNOSIS — C02.9 MALIGNANT NEOPLASM OF TONGUE, UNSPECIFIED: ICD-10-CM

## 2025-03-06 DIAGNOSIS — Z98.890 OTHER SPECIFIED POSTPROCEDURAL STATES: Chronic | ICD-10-CM

## 2025-03-06 DIAGNOSIS — I10 ESSENTIAL (PRIMARY) HYPERTENSION: ICD-10-CM

## 2025-03-06 DIAGNOSIS — Z01.818 ENCOUNTER FOR OTHER PREPROCEDURAL EXAMINATION: ICD-10-CM

## 2025-03-06 DIAGNOSIS — E11.9 TYPE 2 DIABETES MELLITUS WITHOUT COMPLICATIONS: ICD-10-CM

## 2025-03-06 LAB
A1C WITH ESTIMATED AVERAGE GLUCOSE RESULT: 9.4 % — HIGH (ref 4–5.6)
ALBUMIN SERPL ELPH-MCNC: 4.3 G/DL — SIGNIFICANT CHANGE UP (ref 3.3–5.2)
ALP SERPL-CCNC: 99 U/L — SIGNIFICANT CHANGE UP (ref 40–120)
ALT FLD-CCNC: 11 U/L — SIGNIFICANT CHANGE UP
ANION GAP SERPL CALC-SCNC: 11 MMOL/L — SIGNIFICANT CHANGE UP (ref 5–17)
APTT BLD: 27.5 SEC — SIGNIFICANT CHANGE UP (ref 24.5–35.6)
AST SERPL-CCNC: 25 U/L — SIGNIFICANT CHANGE UP
BASOPHILS # BLD AUTO: 0.1 K/UL — SIGNIFICANT CHANGE UP (ref 0–0.2)
BASOPHILS NFR BLD AUTO: 1.5 % — SIGNIFICANT CHANGE UP (ref 0–2)
BILIRUB SERPL-MCNC: 0.5 MG/DL — SIGNIFICANT CHANGE UP (ref 0.4–2)
BLD GP AB SCN SERPL QL: SIGNIFICANT CHANGE UP
BUN SERPL-MCNC: 19.2 MG/DL — SIGNIFICANT CHANGE UP (ref 8–20)
CALCIUM SERPL-MCNC: 9.1 MG/DL — SIGNIFICANT CHANGE UP (ref 8.4–10.5)
CHLORIDE SERPL-SCNC: 103 MMOL/L — SIGNIFICANT CHANGE UP (ref 96–108)
CO2 SERPL-SCNC: 23 MMOL/L — SIGNIFICANT CHANGE UP (ref 22–29)
CREAT SERPL-MCNC: 1.21 MG/DL — SIGNIFICANT CHANGE UP (ref 0.5–1.3)
EGFR: 47 ML/MIN/1.73M2 — LOW
EGFR: 47 ML/MIN/1.73M2 — LOW
EOSINOPHIL # BLD AUTO: 0.39 K/UL — SIGNIFICANT CHANGE UP (ref 0–0.5)
EOSINOPHIL NFR BLD AUTO: 5.7 % — SIGNIFICANT CHANGE UP (ref 0–6)
ESTIMATED AVERAGE GLUCOSE: 223 MG/DL — HIGH (ref 68–114)
GLUCOSE SERPL-MCNC: 196 MG/DL — HIGH (ref 70–99)
HCT VFR BLD CALC: 31.5 % — LOW (ref 34.5–45)
HGB BLD-MCNC: 10.2 G/DL — LOW (ref 11.5–15.5)
IMM GRANULOCYTES # BLD AUTO: 0.03 K/UL — SIGNIFICANT CHANGE UP (ref 0–0.07)
IMM GRANULOCYTES NFR BLD AUTO: 0.4 % — SIGNIFICANT CHANGE UP (ref 0–0.9)
INR BLD: 0.93 RATIO — SIGNIFICANT CHANGE UP (ref 0.85–1.16)
LYMPHOCYTES # BLD AUTO: 1.57 K/UL — SIGNIFICANT CHANGE UP (ref 1–3.3)
LYMPHOCYTES NFR BLD AUTO: 22.9 % — SIGNIFICANT CHANGE UP (ref 13–44)
MCHC RBC-ENTMCNC: 31.5 PG — SIGNIFICANT CHANGE UP (ref 27–34)
MCHC RBC-ENTMCNC: 32.4 G/DL — SIGNIFICANT CHANGE UP (ref 32–36)
MCV RBC AUTO: 97.2 FL — SIGNIFICANT CHANGE UP (ref 80–100)
MONOCYTES # BLD AUTO: 0.49 K/UL — SIGNIFICANT CHANGE UP (ref 0–0.9)
MONOCYTES NFR BLD AUTO: 7.1 % — SIGNIFICANT CHANGE UP (ref 2–14)
NEUTROPHILS # BLD AUTO: 4.28 K/UL — SIGNIFICANT CHANGE UP (ref 1.8–7.4)
NEUTROPHILS NFR BLD AUTO: 62.4 % — SIGNIFICANT CHANGE UP (ref 43–77)
NRBC # BLD AUTO: 0 K/UL — SIGNIFICANT CHANGE UP (ref 0–0)
NRBC # FLD: 0 K/UL — SIGNIFICANT CHANGE UP (ref 0–0)
NRBC BLD AUTO-RTO: 0 /100 WBCS — SIGNIFICANT CHANGE UP (ref 0–0)
PLATELET # BLD AUTO: 222 K/UL — SIGNIFICANT CHANGE UP (ref 150–400)
PMV BLD: 12.2 FL — SIGNIFICANT CHANGE UP (ref 7–13)
POTASSIUM SERPL-MCNC: 5.1 MMOL/L — SIGNIFICANT CHANGE UP (ref 3.5–5.3)
POTASSIUM SERPL-SCNC: 5.1 MMOL/L — SIGNIFICANT CHANGE UP (ref 3.5–5.3)
PROT SERPL-MCNC: 7.4 G/DL — SIGNIFICANT CHANGE UP (ref 6.6–8.7)
PROTHROM AB SERPL-ACNC: 10.5 SEC — SIGNIFICANT CHANGE UP (ref 9.9–13.4)
RBC # BLD: 3.24 M/UL — LOW (ref 3.8–5.2)
RBC # FLD: 12.3 % — SIGNIFICANT CHANGE UP (ref 10.3–14.5)
SODIUM SERPL-SCNC: 137 MMOL/L — SIGNIFICANT CHANGE UP (ref 135–145)
WBC # BLD: 6.86 K/UL — SIGNIFICANT CHANGE UP (ref 3.8–10.5)
WBC # FLD AUTO: 6.86 K/UL — SIGNIFICANT CHANGE UP (ref 3.8–10.5)

## 2025-03-06 PROCEDURE — 85610 PROTHROMBIN TIME: CPT

## 2025-03-06 PROCEDURE — 86850 RBC ANTIBODY SCREEN: CPT

## 2025-03-06 PROCEDURE — 85025 COMPLETE CBC W/AUTO DIFF WBC: CPT

## 2025-03-06 PROCEDURE — 93010 ELECTROCARDIOGRAM REPORT: CPT

## 2025-03-06 PROCEDURE — 86901 BLOOD TYPING SEROLOGIC RH(D): CPT

## 2025-03-06 PROCEDURE — 36415 COLL VENOUS BLD VENIPUNCTURE: CPT

## 2025-03-06 PROCEDURE — G0463: CPT

## 2025-03-06 PROCEDURE — 80053 COMPREHEN METABOLIC PANEL: CPT

## 2025-03-06 PROCEDURE — 99214 OFFICE O/P EST MOD 30 MIN: CPT

## 2025-03-06 PROCEDURE — 86900 BLOOD TYPING SEROLOGIC ABO: CPT

## 2025-03-06 PROCEDURE — 93005 ELECTROCARDIOGRAM TRACING: CPT

## 2025-03-06 PROCEDURE — 85730 THROMBOPLASTIN TIME PARTIAL: CPT

## 2025-03-06 PROCEDURE — 83036 HEMOGLOBIN GLYCOSYLATED A1C: CPT

## 2025-03-06 RX ORDER — SITAGLIPTIN 100 MG/1
1 TABLET, FILM COATED ORAL
Refills: 0 | DISCHARGE

## 2025-03-06 RX ORDER — TROSPIUM CHLORIDE 20 MG/1
1 TABLET, FILM COATED ORAL
Refills: 0 | DISCHARGE

## 2025-03-06 RX ORDER — DAPAGLIFLOZIN 5 MG/1
1 TABLET, FILM COATED ORAL
Refills: 0 | DISCHARGE

## 2025-03-06 RX ORDER — METFORMIN HYDROCHLORIDE 850 MG/1
0 TABLET ORAL
Refills: 0 | DISCHARGE

## 2025-03-06 NOTE — H&P PST ADULT - NSICDXPASTMEDICALHX_GEN_ALL_CORE_FT
PAST MEDICAL HISTORY:  Diabetes     HLD (hyperlipidemia)     HTN (hypertension)     Malignant neoplasm of tongue

## 2025-03-06 NOTE — H&P PST ADULT - RESPIRATORY
Referred To Plastics For Closure Text (Leave Blank If You Do Not Want): After obtaining clear surgical margins the patient was sent to plastics for surgical repair.  The patient understands they will receive post-surgical care and follow-up from the referring physician's office. normal/clear to auscultation bilaterally/no wheezes/no rales/no rhonchi

## 2025-03-06 NOTE — H&P PST ADULT - NS PRO FEM PAP REASON CONTRAINDICATED
patient instructed she does not need it anymore patient instructed she does not need it anymore, last one at age 65

## 2025-03-06 NOTE — H&P PST ADULT - PROBLEM SELECTOR PLAN 3
Patient instructed to make cardiologist appointment. States Dr. Dorsey is aware of difficulty obtaining cardiac appointment. Has appointment with Dr. Dorsey today.

## 2025-03-06 NOTE — H&P PST ADULT - PROBLEM SELECTOR PLAN 1
74 year old patient, poor historian, does not know how to write, with a PMH of diabetes, GERD, HTN, HLD who presents today preop for right partial glossectomy with modified neck dissection, possible skin graft reconstruction with Dr. Dorsey for malignant neoplasm of tongue unspecified. Day of surgery is 3/14/25.

## 2025-03-06 NOTE — H&P PST ADULT - HISTORY OF PRESENT ILLNESS
74F presents for an initial consultation for tongue cancer. Patient first noticed the tongue mass in Sept/Oct 2024. She has tongue irritation when it scrapes her teeth. She notes burning pain, worst at night. Associated symptoms include right otalgia, occasional fevers, chills. Tolerating solids and thin liquids without choking or aspirating. Patient had biopsy of ulcerative raised tongue lesion performed 2/13/2025 and resulted for SCCa 74 year old patient with a PMH of diabetes, GERD, HTN, HLD who presents today preop for right partial glossectomy with modified neck dissection, possible skin graft reconstruction with Dr. Dorsey for malignant neoplasm of tongue unspecified. Day of surgery is 3/14/25. Patient attributes tongue mass to have developed post dental procedures 6 years ago. Patient has a tongue mass that causes irritation and burning. Worst is at night. At it's worst pain is a 5 out of 10. Patient has taken ibuprofen and Tylenol to relief pain. No trouble swallowing or trouble speaking. Endorses occasional fevers and chills in the past. Tolerating solids and thin liquids without choking or aspirating. Patient had biopsy of ulcerative raised tongue lesion performed 2/13/2025 and resulted for SCCa. Denies chest pain, shortness of breath, fever and/or chills.  74 year old patient, poor historian, does not know how to write, with a PMH of diabetes, GERD, HTN, HLD who presents today preop for right partial glossectomy with modified neck dissection, possible skin graft reconstruction with Dr. Dorsey for malignant neoplasm of tongue unspecified. Day of surgery is 3/14/25. Patient attributes tongue mass to have developed post dental procedures 6 years ago. Patient has a tongue mass that causes irritation and burning. Pain is worst is at night. At it's worst pain is a 5 out of 10. Patient has taken ibuprofen and Tylenol for pain relief. No dysphagia or dysphasia endorsed. Endorses occasional fevers and chills in the past. Tolerating solids and thin liquids without choking or aspirating. Patient had biopsy of ulcerative raised tongue lesion performed 2/13/2025 and resulted for SCCa. Denies chest pain, shortness of breath, fever and/or chills.

## 2025-03-06 NOTE — H&P PST ADULT - PROBLEM SELECTOR PLAN 2
Patient instructed to stop Farxiga 3 days before surgery and metformin day of surgery. Off note - patient is a poor historian, does not know how to read or write.

## 2025-03-06 NOTE — H&P PST ADULT - NSANTHOSAYNRD_GEN_A_CORE
No. SAADIA screening performed.  STOP BANG Legend: 0-2 = LOW Risk; 3-4 = INTERMEDIATE Risk; 5-8 = HIGH Risk

## 2025-03-06 NOTE — H&P PST ADULT - ASSESSMENT
74 year old patient, poor historian, does not know how to write, with a PMH of diabetes, GERD, HTN, HLD who presents today preop for right partial glossectomy with modified neck dissection, possible skin graft reconstruction with Dr. Dorsey for malignant neoplasm of tongue unspecified. Day of surgery is 3/14/25.  Off note - patient states she has not been able to get a cardiologist appointment before surgery. Patient states Dr. Dorsey's team is aware and has an appointment with him this afternoon, 3/6/25.    Surgical protocol reviewed with patient today.  Patient to follow up with PCP for evaluation and clearance prior to surgery.  Patient to follow up with cardiology for evaluation and clearance prior to surgery.  Patient instructed on NPO protocol.  Patient instructed to stop Farxiga 3 days before and metformin to hold day of surgery.  Patient instructed to stop NSAID, all vitamin supplements, Fish oil, CoQ 10, herbals 5 days before this surgery.  Patient okay to take Tylenol as needed for pain.  Patient instructed on infection prevention.  Chlorhexidine scrub instructions provided.    CAPRINI SCORE    AGE RELATED RISK FACTORS                                                             [ ] Age 41-60 years                                            (1 Point)  [xx ] Age: 61-74 years                                           (2 Points)                 [ ] Age= 75 years                                                (3 Points)             DISEASE RELATED RISK FACTORS                                                       [ ] Edema in the lower extremities                 (1 Point)                     [ ] Varicose veins                                               (1 Point)                                 [ ] BMI > 25 Kg/m2                                            (1 Point)                                  [ ] Serious infection (ie PNA)                            (1 Point)                     [ ] Lung disease ( COPD, Emphysema)            (1 Point)                                                                          [ ] Acute myocardial infarction                         (1 Point)                  [ ] Congestive heart failure (in the previous month)  (1 Point)         [ ] Inflammatory bowel disease                            (1 Point)                  [ ] Central venous access, PICC or Port               (2 points)       (within the last month)                                                                [ ] Stroke (in the previous month)                        (5 Points)    [ ] Previous or present malignancy                       (2 points)                                                                                                                                                         HEMATOLOGY RELATED FACTORS                                                         [ ] Prior episodes of VTE                                     (3 Points)                     [ ] Positive family history for VTE                      (3 Points)                  [ ] Prothrombin 64855 A                                     (3 Points)                     [ ] Factor V Leiden                                                (3 Points)                        [ ] Lupus anticoagulants                                      (3 Points)                                                           [ ] Anticardiolipin antibodies                              (3 Points)                                                       [ ] High homocysteine in the blood                   (3 Points)                                             [ ] Other congenital or acquired thrombophilia      (3 Points)                                                [ ] Heparin induced thrombocytopenia                  (3 Points)                                        MOBILITY RELATED FACTORS  [ ] Bed rest                                                         (1 Point)  [ ] Plaster cast                                                    (2 points)  [ ] Bed bound for more than 72 hours           (2 Points)    GENDER SPECIFIC FACTORS  [ ] Pregnancy or had a baby within the last month   (1 Point)  [ ] Post-partum < 6 weeks                                   (1 Point)  [ ] Hormonal therapy  or oral contraception   (1 Point)  [ ] History of pregnancy complications              (1 point)  [ ] Unexplained or recurrent              (1 Point)    OTHER RISK FACTORS                                           (1 Point)  [ xx] BMI >40, smoking, diabetes requiring insulin, chemotherapy  blood transfusions and length of surgery over 2 hours    SURGERY RELATED RISK FACTORS  [ ]  Section within the last month     (1 Point)  [ ] Minor surgery                                                  (1 Point)  [ ] Arthroscopic surgery                                       (2 Points)  [xx ] Planned major surgery lasting more            (2 Points)      than 45 minutes     [ ] Elective hip or knee joint replacement       (5 points)       surgery                                                TRAUMA RELATED RISK FACTORS  [ ] Fracture of the hip, pelvis, or leg                       (5 Points)  [ ] Spinal cord injury resulting in paralysis             (5 points)       (in the previous month)    [ ] Paralysis  (less than 1 month)                             (5 Points)  [ ] Multiple Trauma within 1 month                        (5 Points)    Total Score [    5    ]    Caprini Score 0-2: Low Risk, NO VTE prophylaxis required for most patients, encourage ambulation  Caprini Score 3-6: Moderate Risk , pharmacologic VTE prophylaxis is indicated for most patients (in the absence of contraindications)  Caprini Score Greater than or =7: High risk, pharmocologic VTE prophylaxis indicated for most patients (in the absence of contraindications)    OPIOID RISK TOOL    AMANDEEP EACH BOX THAT APPLIES AND ADD TOTALS AT THE END    FAMILY HISTORY OF SUBSTANCE ABUSE                 FEMALE         MALE                                                Alcohol                             [  ]1 pt          [  ]3pts                                               Illegal Durgs                     [  ]2 pts        [  ]3pts                                               Rx Drugs                           [  ]4 pts        [  ]4 pts    PERSONAL HISTORY OF SUBSTANCE ABUSE                                                                                          Alcohol                             [  ]3 pts       [  ]3 pts                                               Illegal Drugs                     [  ]4 pts        [  ]4 pts                                               Rx Drugs                           [  ]5 pts        [  ]5 pts    AGE BETWEEN 16-45 YEARS                                      [  ]1 pt         [  ]1 pt    HISTORY OF PREADOLESCENT   SEXUAL ABUSE                                                             [  ]3 pts        [  ]0pts    PSYCHOLOGICAL DISEASE                     ADD, OCD, Bipolar, Schizophrenia        [  ]2 pts         [  ]2 pts                      Depression                                               [  ]1 pt           [  ]1 pt           SCORING TOTAL   (add numbers and type here)              (**0*)                                     A score of 3 or lower indicated LOW risk for future opioid abuse  A score of 4 to 7 indicated moderate risk for future opioid abuse  A score of 8 or higher indicates a high risk for opioid abuse

## 2025-03-07 ENCOUNTER — OUTPATIENT (OUTPATIENT)
Dept: OUTPATIENT SERVICES | Facility: HOSPITAL | Age: 75
LOS: 1 days | End: 2025-03-07
Payer: MEDICARE

## 2025-03-07 ENCOUNTER — APPOINTMENT (OUTPATIENT)
Dept: CT IMAGING | Facility: CLINIC | Age: 75
End: 2025-03-07
Payer: MEDICARE

## 2025-03-07 DIAGNOSIS — C02.9 MALIGNANT NEOPLASM OF TONGUE, UNSPECIFIED: ICD-10-CM

## 2025-03-07 DIAGNOSIS — Z98.890 OTHER SPECIFIED POSTPROCEDURAL STATES: Chronic | ICD-10-CM

## 2025-03-07 PROCEDURE — 70491 CT SOFT TISSUE NECK W/DYE: CPT

## 2025-03-07 PROCEDURE — 70491 CT SOFT TISSUE NECK W/DYE: CPT | Mod: 26

## 2025-03-19 ENCOUNTER — RESULT REVIEW (OUTPATIENT)
Age: 75
End: 2025-03-19

## 2025-03-21 ENCOUNTER — APPOINTMENT (OUTPATIENT)
Dept: OTOLARYNGOLOGY | Facility: HOSPITAL | Age: 75
End: 2025-03-21

## 2025-03-27 ENCOUNTER — APPOINTMENT (OUTPATIENT)
Dept: OTOLARYNGOLOGY | Facility: CLINIC | Age: 75
End: 2025-03-27

## 2025-04-10 ENCOUNTER — APPOINTMENT (OUTPATIENT)
Dept: OTOLARYNGOLOGY | Facility: CLINIC | Age: 75
End: 2025-04-10
Payer: MEDICARE

## 2025-04-10 VITALS
SYSTOLIC BLOOD PRESSURE: 147 MMHG | WEIGHT: 96 LBS | BODY MASS INDEX: 19.35 KG/M2 | HEIGHT: 59 IN | DIASTOLIC BLOOD PRESSURE: 70 MMHG | HEART RATE: 73 BPM

## 2025-04-10 DIAGNOSIS — C02.9 MALIGNANT NEOPLASM OF TONGUE, UNSPECIFIED: ICD-10-CM

## 2025-04-10 DIAGNOSIS — I65.22 OCCLUSION AND STENOSIS OF LEFT CAROTID ARTERY: ICD-10-CM

## 2025-04-10 PROCEDURE — 99214 OFFICE O/P EST MOD 30 MIN: CPT

## 2025-04-11 ENCOUNTER — OUTPATIENT (OUTPATIENT)
Dept: OUTPATIENT SERVICES | Facility: HOSPITAL | Age: 75
LOS: 1 days | End: 2025-04-11
Payer: MEDICARE

## 2025-04-11 VITALS
HEART RATE: 76 BPM | OXYGEN SATURATION: 99 % | WEIGHT: 90.39 LBS | SYSTOLIC BLOOD PRESSURE: 112 MMHG | RESPIRATION RATE: 18 BRPM | HEIGHT: 56 IN | TEMPERATURE: 97 F | DIASTOLIC BLOOD PRESSURE: 50 MMHG

## 2025-04-11 DIAGNOSIS — Z01.818 ENCOUNTER FOR OTHER PREPROCEDURAL EXAMINATION: ICD-10-CM

## 2025-04-11 DIAGNOSIS — E11.9 TYPE 2 DIABETES MELLITUS WITHOUT COMPLICATIONS: ICD-10-CM

## 2025-04-11 DIAGNOSIS — Z29.9 ENCOUNTER FOR PROPHYLACTIC MEASURES, UNSPECIFIED: ICD-10-CM

## 2025-04-11 DIAGNOSIS — Z98.890 OTHER SPECIFIED POSTPROCEDURAL STATES: Chronic | ICD-10-CM

## 2025-04-11 DIAGNOSIS — C02.9 MALIGNANT NEOPLASM OF TONGUE, UNSPECIFIED: ICD-10-CM

## 2025-04-11 DIAGNOSIS — E03.9 HYPOTHYROIDISM, UNSPECIFIED: ICD-10-CM

## 2025-04-11 DIAGNOSIS — I10 ESSENTIAL (PRIMARY) HYPERTENSION: ICD-10-CM

## 2025-04-11 DIAGNOSIS — Z78.9 OTHER SPECIFIED HEALTH STATUS: ICD-10-CM

## 2025-04-11 LAB
A1C WITH ESTIMATED AVERAGE GLUCOSE RESULT: 9.6 % — HIGH (ref 4–5.6)
ANION GAP SERPL CALC-SCNC: 12 MMOL/L — SIGNIFICANT CHANGE UP (ref 5–17)
APTT BLD: 28 SEC — SIGNIFICANT CHANGE UP (ref 24.5–35.6)
BASOPHILS # BLD AUTO: 0.13 K/UL — SIGNIFICANT CHANGE UP (ref 0–0.2)
BASOPHILS NFR BLD AUTO: 1.6 % — SIGNIFICANT CHANGE UP (ref 0–2)
BLD GP AB SCN SERPL QL: SIGNIFICANT CHANGE UP
BUN SERPL-MCNC: 21.2 MG/DL — HIGH (ref 8–20)
CALCIUM SERPL-MCNC: 8.8 MG/DL — SIGNIFICANT CHANGE UP (ref 8.4–10.5)
CHLORIDE SERPL-SCNC: 95 MMOL/L — LOW (ref 96–108)
CO2 SERPL-SCNC: 25 MMOL/L — SIGNIFICANT CHANGE UP (ref 22–29)
CREAT SERPL-MCNC: 1.26 MG/DL — SIGNIFICANT CHANGE UP (ref 0.5–1.3)
EGFR: 45 ML/MIN/1.73M2 — LOW
EGFR: 45 ML/MIN/1.73M2 — LOW
EOSINOPHIL # BLD AUTO: 0.83 K/UL — HIGH (ref 0–0.5)
EOSINOPHIL NFR BLD AUTO: 10.5 % — HIGH (ref 0–6)
ESTIMATED AVERAGE GLUCOSE: 229 MG/DL — HIGH (ref 68–114)
GLUCOSE SERPL-MCNC: 392 MG/DL — HIGH (ref 70–99)
HCT VFR BLD CALC: 30.8 % — LOW (ref 34.5–45)
HGB BLD-MCNC: 10 G/DL — LOW (ref 11.5–15.5)
IMM GRANULOCYTES # BLD AUTO: 0.02 K/UL — SIGNIFICANT CHANGE UP (ref 0–0.07)
IMM GRANULOCYTES NFR BLD AUTO: 0.3 % — SIGNIFICANT CHANGE UP (ref 0–0.9)
INR BLD: 0.83 RATIO — LOW (ref 0.85–1.16)
LYMPHOCYTES # BLD AUTO: 1.31 K/UL — SIGNIFICANT CHANGE UP (ref 1–3.3)
LYMPHOCYTES NFR BLD AUTO: 16.5 % — SIGNIFICANT CHANGE UP (ref 13–44)
MCHC RBC-ENTMCNC: 31.6 PG — SIGNIFICANT CHANGE UP (ref 27–34)
MCHC RBC-ENTMCNC: 32.5 G/DL — SIGNIFICANT CHANGE UP (ref 32–36)
MCV RBC AUTO: 97.5 FL — SIGNIFICANT CHANGE UP (ref 80–100)
MONOCYTES # BLD AUTO: 0.55 K/UL — SIGNIFICANT CHANGE UP (ref 0–0.9)
MONOCYTES NFR BLD AUTO: 6.9 % — SIGNIFICANT CHANGE UP (ref 2–14)
NEUTROPHILS # BLD AUTO: 5.09 K/UL — SIGNIFICANT CHANGE UP (ref 1.8–7.4)
NEUTROPHILS NFR BLD AUTO: 64.2 % — SIGNIFICANT CHANGE UP (ref 43–77)
NRBC # BLD AUTO: 0 K/UL — SIGNIFICANT CHANGE UP (ref 0–0)
NRBC # FLD: 0 K/UL — SIGNIFICANT CHANGE UP (ref 0–0)
NRBC BLD AUTO-RTO: 0 /100 WBCS — SIGNIFICANT CHANGE UP (ref 0–0)
PLATELET # BLD AUTO: 253 K/UL — SIGNIFICANT CHANGE UP (ref 150–400)
PMV BLD: 12.1 FL — SIGNIFICANT CHANGE UP (ref 7–13)
POTASSIUM SERPL-MCNC: 4.8 MMOL/L — SIGNIFICANT CHANGE UP (ref 3.5–5.3)
POTASSIUM SERPL-SCNC: 4.8 MMOL/L — SIGNIFICANT CHANGE UP (ref 3.5–5.3)
PROTHROM AB SERPL-ACNC: 9.6 SEC — LOW (ref 9.9–13.4)
RBC # BLD: 3.16 M/UL — LOW (ref 3.8–5.2)
RBC # FLD: 11.9 % — SIGNIFICANT CHANGE UP (ref 10.3–14.5)
SODIUM SERPL-SCNC: 132 MMOL/L — LOW (ref 135–145)
T3 SERPL-MCNC: 86 NG/DL — SIGNIFICANT CHANGE UP (ref 80–200)
T4 AB SER-ACNC: 9 UG/DL — SIGNIFICANT CHANGE UP (ref 4.5–12)
TSH SERPL-MCNC: 0.46 UIU/ML — SIGNIFICANT CHANGE UP (ref 0.27–4.2)
WBC # BLD: 7.93 K/UL — SIGNIFICANT CHANGE UP (ref 3.8–10.5)
WBC # FLD AUTO: 7.93 K/UL — SIGNIFICANT CHANGE UP (ref 3.8–10.5)

## 2025-04-11 PROCEDURE — 85025 COMPLETE CBC W/AUTO DIFF WBC: CPT

## 2025-04-11 PROCEDURE — 84436 ASSAY OF TOTAL THYROXINE: CPT

## 2025-04-11 PROCEDURE — 36415 COLL VENOUS BLD VENIPUNCTURE: CPT

## 2025-04-11 PROCEDURE — 84480 ASSAY TRIIODOTHYRONINE (T3): CPT

## 2025-04-11 PROCEDURE — 86900 BLOOD TYPING SEROLOGIC ABO: CPT

## 2025-04-11 PROCEDURE — 86850 RBC ANTIBODY SCREEN: CPT

## 2025-04-11 PROCEDURE — 86901 BLOOD TYPING SEROLOGIC RH(D): CPT

## 2025-04-11 PROCEDURE — 85730 THROMBOPLASTIN TIME PARTIAL: CPT

## 2025-04-11 PROCEDURE — 83036 HEMOGLOBIN GLYCOSYLATED A1C: CPT

## 2025-04-11 PROCEDURE — 80048 BASIC METABOLIC PNL TOTAL CA: CPT

## 2025-04-11 PROCEDURE — 84443 ASSAY THYROID STIM HORMONE: CPT

## 2025-04-11 PROCEDURE — 85610 PROTHROMBIN TIME: CPT

## 2025-04-11 PROCEDURE — G0463: CPT

## 2025-04-11 NOTE — H&P PST ADULT - HISTORY OF PRESENT ILLNESS
73 y/o F seen in PST 4/11   in anticipation of scheduled RIGHT PARTIAL GLOSSECTOMY W/MODIFIED NECK DISSECTION AND POSSIBLE SKIN GRAFT RECONSTRUCTION  on  4/16/25  at [facility] w/DR SEAN ARMAS.  PMHX: DM2, GERD, HTN, HLD. Pt is [place] speaking but also poor historian, unable to read/write.  She was seen in PST for this surgery on 3/6/25 but it was rescheduled due to   Patient attributes tongue mass to have developed post dental procedures 6 years ago. Patient has a tongue mass that causes irritation and  burning. Pain is worst is at night. At it's worst pain is a 5 out of 10. Patient has taken ibuprofen and Tylenol for pain relief. No dysphagia or dysphasia endorsed. Endorses occasional fevers and chills in the past. Tolerating solids and thin liquids without choking or aspirating. Patient had biopsy of ulcerative raised tongue lesion performed 2/13/2025 and resulted for SCCa. Denies chest pain, shortness of breath, fever and/or chills.  PENDING MEDICAL OPTIMIZATION WITH PCP 73 y/o F seen in PST 4/11   in anticipation of scheduled RIGHT PARTIAL GLOSSECTOMY W/MODIFIED NECK DISSECTION AND POSSIBLE SKIN GRAFT RECONSTRUCTION  on  4/16/25  at [facility] w/DR SEAN ARMAS.  PMHX: DM2, GERD, HTN, HLD. Pt is [place] speaking but also poor historian, unable to read/write.  She was seen in PST for this surgery on 3/6/25 but it was rescheduled due to her PMD previously so that carotid stenosis eval could be done before proceeding. Pt seen by Vascular who recommended not intervention at this time, optingfor f/u in 6 months instead. Pt reports tongue mass feels like it's getting larger. Patient attributes tongue mass to have developed post dental procedures 6 years ago. Patient has a tongue mass that causes irritation and  burning. Pain is worst is at night. At it's worst pain is a 5 out of 10. Patient has taken ibuprofen and Tylenol for pain relief. No dysphagia or dysphasia endorsed. Endorses occasional fevers and chills in the past. Tolerating solids and thin liquids without choking or aspirating. Patient had biopsy of ulcerative raised tongue lesion performed 2/13/2025 and resulted for SCCa. Denies chest pain, shortness of breath, fever and/or chills.  PENDING MEDICAL OPTIMIZATION WITH PCP 75 y/o F seen in PST 4/11   in anticipation of scheduled RIGHT PARTIAL GLOSSECTOMY W/MODIFIED NECK DISSECTION AND POSSIBLE SKIN GRAFT RECONSTRUCTION  on  4/16/25  at [facility] w/DR SEAN ARMAS.  PMHX: DM2, GERD, HTN, HLD. Pt is French speaking but also poor historian, unable to read/write.  She was seen in PST for this surgery on 3/6/25 but it was rescheduled due to her PMD previously so that carotid stenosis eval could be done before proceeding. Pt seen by Vascular who recommended no intervention at this time, opting for f/u in 6 months instead. Pt reports tongue mass feels like it's getting larger. Patient attributes tongue mass to have developed post dental procedures 6 years ago. Patient has a tongue mass that causes irritation and  burning occasionally. Tolerating solids and thin liquids without choking or aspirating. Patient had biopsy of ulcerative raised tongue lesion performed 2/13/2025 and resulted for SCCa. Denies chest pain, shortness of breath, fever and/or chills.  PENDING MEDICAL OPTIMIZATION WITH PCP BRANDEE CORTEZ

## 2025-04-11 NOTE — H&P PST ADULT - ASSESSMENT
73 y/o F seen in PST    in anticipation of scheduled RIGHT PARTIAL GLOSSECTOMY W/MODIFIED NECK DISSECTION AND POSSIBLE SKIN GRAFT RECONSTRUCTION  on  25  at [facility] w/DR SEAN ARMAS.  PMHX: DM2, GERD, HTN, HLD. Pt is [place] speaking but also poor historian, unable to read/write.  She was seen in PST for this surgery on 3/6/25 but it was rescheduled due to her PMD previously so that carotid stenosis eval could be done before proceeding. Pt seen by Vascular who recommended no intervention at this time, opting for f/u in 6 months instead. Pt reports tongue mass feels like it's getting larger. Patient attributes tongue mass to have developed post dental procedures 6 years ago. Patient has a tongue mass that causes irritation and  burning occasionally. Tolerating solids and thin liquids without choking or aspirating. Patient had biopsy of ulcerative raised tongue lesion performed 2025 and resulted for SCCa. Denies chest pain, shortness of breath, fever and/or chills.  PENDING MEDICAL OPTIMIZATION WITH PCP BRANDEE CORTEZ    OPIOID RISK TOOL    AMANDEEP EACH BOX THAT APPLIES AND ADD TOTALS AT THE END    FAMILY HISTORY OF SUBSTANCE ABUSE                 FEMALE         MALE                                               Alcohol                             [  ]1 pt          [  ]3pts                                               Illegal Durgs                     [  ]2 pts        [  ]3pts                                               Rx Drugs                           [  ]4 pts        [  ]4 pts    PERSONAL HISTORY OF SUBSTANCE ABUSE                                                                                         Alcohol                             [  ]3 pts       [  ]3 pts                                               Illegal Durgs                     [  ]4 pts        [  ]4 pts                                               Rx Drugs                           [  ]5 pts        [  ]5 pts    AGE BETWEEN 16-45 YEARS                                      [  ]1 pt         [  ]1 pt    HISTORY OF PREADOLESCENT  SEXUAL ABUSE                                                             [  ]3 pts        [  ]0pts    PSYCHOLOGICAL DISEASE                     ADD, OCD, Bipolar, Schizophrenia        [  ]2 pts         [  ]2 pts                      Depression                                               [  ]1 pt           [  ]1 pt          Total: 0  A score of 3 or lower indicated LOW risk for future opiod abuse  A score of 4 to 7 indicated moderate risk for future opiod abuse  A score of 8 or higher indicates a high risk for opiod abuse       CAPRINI SCORE    AGE RELATED RISK FACTORS                                                             [ ] Age 41-60 years                                            (1 Point)  [ X] Age: 61-74 years                                           (2 Points)                 [ ] Age= 75 years                                                (3 Points)             DISEASE RELATED RISK FACTORS                                                       [ ] Edema in the lower extremities                 (1 Point)                     [ ] Varicose veins                                               (1 Point)                                 [ ] BMI > 25 Kg/m2                                            (1 Point)                                  [ ] Serious infection (ie PNA)                            (1 Point)                     [ ] Lung disease ( COPD, Emphysema)            (1 Point)                                                                          [ ] Acute myocardial infarction                         (1 Point)                  [ ] Congestive heart failure (in the previous month)  (1 Point)         [ ] Inflammatory bowel disease                            (1 Point)                  [ ] Central venous access, PICC or Port               (2 points)       (within the last month)                                                                [ ] Stroke (in the previous month)                        (5 Points)    [X] Previous or present malignancy                       (2 points)                                                                                                                                                         HEMATOLOGY RELATED FACTORS                                                         [ ] Prior episodes of VTE                                     (3 Points)                     [ ] Positive family history for VTE                      (3 Points)                  [ ] Prothrombin 70730 A                                     (3 Points)                     [ ] Factor V Leiden                                                (3 Points)                        [ ] Lupus anticoagulants                                      (3 Points)                                                           [ ] Anticardiolipin antibodies                              (3 Points)                                                       [ ] High homocysteine in the blood                   (3 Points)                                             [ ] Other congenital or acquired thrombophilia      (3 Points)                                                [ ] Heparin induced thrombocytopenia                  (3 Points)                                        MOBILITY RELATED FACTORS  [ ] Bed rest                                                         (1 Point)  [ ] Plaster cast                                                    (2 points)  [ ] Bed bound for more than 72 hours           (2 Points)    GENDER SPECIFIC FACTORS  [ ] Pregnancy or had a baby within the last month   (1 Point)  [ ] Post-partum < 6 weeks                                   (1 Point)  [ ] Hormonal therapy  or oral contraception   (1 Point)  [ ] History of pregnancy complications              (1 point)  [ ] Unexplained or recurrent              (1 Point)    OTHER RISK FACTORS                                           (1 Point)  [X ] BMI >40, smoking, diabetes requiring insulin, chemotherapy  blood transfusions and length of surgery over 2 hours    SURGERY RELATED RISK FACTORS  [ ]  Section within the last month     (1 Point)  [ ] Minor surgery                                                  (1 Point)  [ ] Arthroscopic surgery                                       (2 Points)  [X ] Planned major surgery lasting more            (2 Points)      than 45 minutes     [ ] Elective hip or knee joint replacement       (5 points)       surgery                                                TRAUMA RELATED RISK FACTORS  [ ] Fracture of the hip, pelvis, or leg                       (5 Points)  [ ] Spinal cord injury resulting in paralysis             (5 points)       (in the previous month)    [ ] Paralysis  (less than 1 month)                             (5 Points)  [ ] Multiple Trauma within 1 month                        (5 Points)    Total Score [     7]    Caprini Score 0-2: Low Risk, NO VTE prophylaxis required for most patients, encourage ambulation  Caprini Score 3-6: Moderate Risk , pharmacologic VTE prophylaxis is indicated for most patients (in the absence of contraindications)  Caprini Score Greater than or =7: High risk, pharmocologic VTE prophylaxis indicated for most patients (in the absence of contraindications)

## 2025-04-11 NOTE — H&P PST ADULT - PROBLEM SELECTOR PLAN 5
LANGUAGE LINE ARNOLDO #[id number]  * and son Patel for education at visit   Instructed to bring PST paperwork to PCP for optimization visit

## 2025-04-11 NOTE — H&P PST ADULT - PROBLEM SELECTOR PLAN 4
Continue RX meds as ordered by prescriber. Verbal and written instructions given to continue and take meds day of surgery with small sip of water at least 2 hours prior to start time unless otherwise instructed by prescriber/surgeon.   PENDING MEDICAL OPTIMIZATION WITH PCP BRANDEE CORTEZ  * and son Patel for education at visit

## 2025-04-11 NOTE — H&P PST ADULT - OTHER CARE PROVIDERS
PCP MARCELINO CORTEZ MARCELINO DE LA ROSA [facility]   CARDIO SHOBHA   [phone]

## 2025-04-11 NOTE — H&P PST ADULT - PROBLEM SELECTOR PLAN 6
Surgical team to address    Total Score [     7]    Caprini Score Greater than or =7: High risk, pharmocologic VTE prophylaxis indicated for most patients (in the absence of contraindications)

## 2025-04-11 NOTE — H&P PST ADULT - PROBLEM SELECTOR PLAN 1
PST 4/11   in anticipation of scheduled RIGHT PARTIAL GLOSSECTOMY W/MODIFIED NECK DISSECTION AND POSSIBLE SKIN GRAFT RECONSTRUCTION  on  4/16/25  at [facility] w/DR SEAN ARMAS.  Patient educated on no shaving x 3 days prior to procedure, correct use of surgical scrub, NPO after MN,  preadmission instructions, day of procedure medications, MEDICAL OPTIMIZATION needed.  Pt instructed to stop vitamins/supplements/herbal medications/NSAIDS for one week prior to surgery and discuss with PMD/PRESCIRBER/SPECIALIST any outstanding items or questions about prescriptions/medications. Written and oral instructions given to patient.   PENDING MEDICAL OPTIMIZATION WITH PCP BRANDEE CORTEZ  * and son Patel for education at visit

## 2025-04-11 NOTE — H&P PST ADULT - PROBLEM SELECTOR PLAN 3
Instructed DO NOT take Metformin or Januvia on day of surgery  Instructed STOP Farxiga on 4/13  Instructed PCP to adjust insulin dosage prior to surgery  * and son Patel for education at visit  PENDING MEDICAL OPTIMIZATION WITH PCP BRANDEE CORTEZ

## 2025-04-16 ENCOUNTER — INPATIENT (INPATIENT)
Facility: HOSPITAL | Age: 75
LOS: 2 days | Discharge: HOME | DRG: 148 | End: 2025-04-19
Attending: OTOLARYNGOLOGY | Admitting: OTOLARYNGOLOGY
Payer: MEDICARE

## 2025-04-16 ENCOUNTER — TRANSCRIPTION ENCOUNTER (OUTPATIENT)
Age: 75
End: 2025-04-16

## 2025-04-16 ENCOUNTER — APPOINTMENT (OUTPATIENT)
Dept: OTOLARYNGOLOGY | Facility: CLINIC | Age: 75
End: 2025-04-16

## 2025-04-16 VITALS
HEART RATE: 80 BPM | TEMPERATURE: 98 F | OXYGEN SATURATION: 100 % | HEIGHT: 56 IN | WEIGHT: 90.39 LBS | RESPIRATION RATE: 16 BRPM | DIASTOLIC BLOOD PRESSURE: 59 MMHG | SYSTOLIC BLOOD PRESSURE: 170 MMHG

## 2025-04-16 DIAGNOSIS — Z98.890 OTHER SPECIFIED POSTPROCEDURAL STATES: Chronic | ICD-10-CM

## 2025-04-16 DIAGNOSIS — C02.9 MALIGNANT NEOPLASM OF TONGUE, UNSPECIFIED: ICD-10-CM

## 2025-04-16 LAB
ACETONE SERPL-MCNC: ABNORMAL
ANION GAP SERPL CALC-SCNC: 14 MMOL/L — SIGNIFICANT CHANGE UP (ref 5–17)
BUN SERPL-MCNC: 25.4 MG/DL — HIGH (ref 8–20)
CALCIUM SERPL-MCNC: 9 MG/DL — SIGNIFICANT CHANGE UP (ref 8.4–10.5)
CHLORIDE SERPL-SCNC: 105 MMOL/L — SIGNIFICANT CHANGE UP (ref 96–108)
CO2 SERPL-SCNC: 21 MMOL/L — LOW (ref 22–29)
CREAT SERPL-MCNC: 0.94 MG/DL — SIGNIFICANT CHANGE UP (ref 0.5–1.3)
EGFR: 64 ML/MIN/1.73M2 — SIGNIFICANT CHANGE UP
EGFR: 64 ML/MIN/1.73M2 — SIGNIFICANT CHANGE UP
GAS PNL BLDV: SIGNIFICANT CHANGE UP
GLUCOSE BLDC GLUCOMTR-MCNC: 109 MG/DL — HIGH (ref 70–99)
GLUCOSE BLDC GLUCOMTR-MCNC: 158 MG/DL — HIGH (ref 70–99)
GLUCOSE BLDC GLUCOMTR-MCNC: 193 MG/DL — HIGH (ref 70–99)
GLUCOSE BLDC GLUCOMTR-MCNC: 237 MG/DL — HIGH (ref 70–99)
GLUCOSE SERPL-MCNC: 164 MG/DL — HIGH (ref 70–99)
POTASSIUM SERPL-MCNC: 4.6 MMOL/L — SIGNIFICANT CHANGE UP (ref 3.5–5.3)
POTASSIUM SERPL-SCNC: 4.6 MMOL/L — SIGNIFICANT CHANGE UP (ref 3.5–5.3)
SODIUM SERPL-SCNC: 139 MMOL/L — SIGNIFICANT CHANGE UP (ref 135–145)

## 2025-04-16 PROCEDURE — 99223 1ST HOSP IP/OBS HIGH 75: CPT

## 2025-04-16 PROCEDURE — 93010 ELECTROCARDIOGRAM REPORT: CPT

## 2025-04-16 RX ORDER — GLUCAGON 3 MG/1
1 POWDER NASAL ONCE
Refills: 0 | Status: DISCONTINUED | OUTPATIENT
Start: 2025-04-16 | End: 2025-04-17

## 2025-04-16 RX ORDER — SODIUM CHLORIDE 9 G/1000ML
1000 INJECTION, SOLUTION INTRAVENOUS
Refills: 0 | Status: DISCONTINUED | OUTPATIENT
Start: 2025-04-16 | End: 2025-04-17

## 2025-04-16 RX ORDER — DEXTROSE 50 % IN WATER 50 %
25 SYRINGE (ML) INTRAVENOUS ONCE
Refills: 0 | Status: DISCONTINUED | OUTPATIENT
Start: 2025-04-16 | End: 2025-04-17

## 2025-04-16 RX ORDER — INSULIN GLARGINE-YFGN 100 [IU]/ML
12 INJECTION, SOLUTION SUBCUTANEOUS AT BEDTIME
Refills: 0 | Status: DISCONTINUED | OUTPATIENT
Start: 2025-04-16 | End: 2025-04-16

## 2025-04-16 RX ORDER — SACUBITRIL AND VALSARTAN 6; 6 MG/1; MG/1
1 PELLET ORAL
Refills: 0 | Status: DISCONTINUED | OUTPATIENT
Start: 2025-04-16 | End: 2025-04-16

## 2025-04-16 RX ORDER — DEXTROSE 50 % IN WATER 50 %
12.5 SYRINGE (ML) INTRAVENOUS ONCE
Refills: 0 | Status: DISCONTINUED | OUTPATIENT
Start: 2025-04-16 | End: 2025-04-17

## 2025-04-16 RX ORDER — ACETAMINOPHEN 500 MG/5ML
650 LIQUID (ML) ORAL EVERY 6 HOURS
Refills: 0 | Status: DISCONTINUED | OUTPATIENT
Start: 2025-04-16 | End: 2025-04-17

## 2025-04-16 RX ORDER — INFLUENZA A VIRUS A/IDAHO/07/2018 (H1N1) ANTIGEN (MDCK CELL DERIVED, PROPIOLACTONE INACTIVATED, INFLUENZA A VIRUS A/INDIANA/08/2018 (H3N2) ANTIGEN (MDCK CELL DERIVED, PROPIOLACTONE INACTIVATED), INFLUENZA B VIRUS B/SINGAPORE/INFTT-16-0610/2016 ANTIGEN (MDCK CELL DERIVED, PROPIOLACTONE INACTIVATED), INFLUENZA B VIRUS B/IOWA/06/2017 ANTIGEN (MDCK CELL DERIVED, PROPIOLACTONE INACTIVATED) 15; 15; 15; 15 UG/.5ML; UG/.5ML; UG/.5ML; UG/.5ML
0.5 INJECTION, SUSPENSION INTRAMUSCULAR ONCE
Refills: 0 | Status: DISCONTINUED | OUTPATIENT
Start: 2025-04-16 | End: 2025-04-19

## 2025-04-16 RX ORDER — INSULIN GLARGINE-YFGN 100 [IU]/ML
8 INJECTION, SOLUTION SUBCUTANEOUS AT BEDTIME
Refills: 0 | Status: DISCONTINUED | OUTPATIENT
Start: 2025-04-16 | End: 2025-04-17

## 2025-04-16 RX ORDER — INSULIN LISPRO 100 U/ML
4 INJECTION, SOLUTION INTRAVENOUS; SUBCUTANEOUS
Refills: 0 | Status: DISCONTINUED | OUTPATIENT
Start: 2025-04-16 | End: 2025-04-17

## 2025-04-16 RX ORDER — DEXTROSE 50 % IN WATER 50 %
15 SYRINGE (ML) INTRAVENOUS ONCE
Refills: 0 | Status: DISCONTINUED | OUTPATIENT
Start: 2025-04-16 | End: 2025-04-17

## 2025-04-16 RX ORDER — METOPROLOL SUCCINATE 50 MG/1
100 TABLET, EXTENDED RELEASE ORAL DAILY
Refills: 0 | Status: DISCONTINUED | OUTPATIENT
Start: 2025-04-16 | End: 2025-04-17

## 2025-04-16 RX ORDER — INSULIN LISPRO 100 U/ML
INJECTION, SOLUTION INTRAVENOUS; SUBCUTANEOUS
Refills: 0 | Status: DISCONTINUED | OUTPATIENT
Start: 2025-04-16 | End: 2025-04-17

## 2025-04-16 RX ORDER — INSULIN GLARGINE-YFGN 100 [IU]/ML
15 INJECTION, SOLUTION SUBCUTANEOUS AT BEDTIME
Refills: 0 | Status: DISCONTINUED | OUTPATIENT
Start: 2025-04-16 | End: 2025-04-16

## 2025-04-16 RX ORDER — INSULIN LISPRO 100 U/ML
INJECTION, SOLUTION INTRAVENOUS; SUBCUTANEOUS AT BEDTIME
Refills: 0 | Status: DISCONTINUED | OUTPATIENT
Start: 2025-04-16 | End: 2025-04-17

## 2025-04-16 RX ORDER — LEVOTHYROXINE SODIUM 300 MCG
50 TABLET ORAL DAILY
Refills: 0 | Status: DISCONTINUED | OUTPATIENT
Start: 2025-04-16 | End: 2025-04-17

## 2025-04-16 RX ADMIN — INSULIN LISPRO 2: 100 INJECTION, SOLUTION INTRAVENOUS; SUBCUTANEOUS at 18:13

## 2025-04-16 RX ADMIN — INSULIN GLARGINE-YFGN 8 UNIT(S): 100 INJECTION, SOLUTION SUBCUTANEOUS at 22:13

## 2025-04-16 RX ADMIN — INSULIN LISPRO 4 UNIT(S): 100 INJECTION, SOLUTION INTRAVENOUS; SUBCUTANEOUS at 18:12

## 2025-04-16 RX ADMIN — INSULIN LISPRO 4: 100 INJECTION, SOLUTION INTRAVENOUS; SUBCUTANEOUS at 12:36

## 2025-04-16 RX ADMIN — Medication 25 MILLIGRAM(S): at 22:10

## 2025-04-17 ENCOUNTER — TRANSCRIPTION ENCOUNTER (OUTPATIENT)
Age: 75
End: 2025-04-17

## 2025-04-17 LAB
ANION GAP SERPL CALC-SCNC: 11 MMOL/L — SIGNIFICANT CHANGE UP (ref 5–17)
APTT BLD: 26.4 SEC — SIGNIFICANT CHANGE UP (ref 24.5–35.6)
BUN SERPL-MCNC: 28 MG/DL — HIGH (ref 8–20)
CALCIUM SERPL-MCNC: 8.6 MG/DL — SIGNIFICANT CHANGE UP (ref 8.4–10.5)
CHLORIDE SERPL-SCNC: 103 MMOL/L — SIGNIFICANT CHANGE UP (ref 96–108)
CO2 SERPL-SCNC: 23 MMOL/L — SIGNIFICANT CHANGE UP (ref 22–29)
CREAT SERPL-MCNC: 1.3 MG/DL — SIGNIFICANT CHANGE UP (ref 0.5–1.3)
EGFR: 43 ML/MIN/1.73M2 — LOW
EGFR: 43 ML/MIN/1.73M2 — LOW
GLUCOSE BLDC GLUCOMTR-MCNC: 129 MG/DL — HIGH (ref 70–99)
GLUCOSE BLDC GLUCOMTR-MCNC: 154 MG/DL — HIGH (ref 70–99)
GLUCOSE BLDC GLUCOMTR-MCNC: 182 MG/DL — HIGH (ref 70–99)
GLUCOSE BLDC GLUCOMTR-MCNC: 182 MG/DL — HIGH (ref 70–99)
GLUCOSE BLDC GLUCOMTR-MCNC: 95 MG/DL — SIGNIFICANT CHANGE UP (ref 70–99)
GLUCOSE SERPL-MCNC: 122 MG/DL — HIGH (ref 70–99)
HCT VFR BLD CALC: 29.7 % — LOW (ref 34.5–45)
HGB BLD-MCNC: 9.9 G/DL — LOW (ref 11.5–15.5)
INR BLD: 0.95 RATIO — SIGNIFICANT CHANGE UP (ref 0.85–1.16)
MAGNESIUM SERPL-MCNC: 1.9 MG/DL — SIGNIFICANT CHANGE UP (ref 1.6–2.6)
MCHC RBC-ENTMCNC: 32.1 PG — SIGNIFICANT CHANGE UP (ref 27–34)
MCHC RBC-ENTMCNC: 33.3 G/DL — SIGNIFICANT CHANGE UP (ref 32–36)
MCV RBC AUTO: 96.4 FL — SIGNIFICANT CHANGE UP (ref 80–100)
NRBC # BLD AUTO: 0 K/UL — SIGNIFICANT CHANGE UP (ref 0–0)
NRBC # FLD: 0 K/UL — SIGNIFICANT CHANGE UP (ref 0–0)
NRBC BLD AUTO-RTO: 0 /100 WBCS — SIGNIFICANT CHANGE UP (ref 0–0)
PHOSPHATE SERPL-MCNC: 3.4 MG/DL — SIGNIFICANT CHANGE UP (ref 2.4–4.7)
PLATELET # BLD AUTO: 197 K/UL — SIGNIFICANT CHANGE UP (ref 150–400)
PMV BLD: 11.3 FL — SIGNIFICANT CHANGE UP (ref 7–13)
POTASSIUM SERPL-MCNC: 4.2 MMOL/L — SIGNIFICANT CHANGE UP (ref 3.5–5.3)
POTASSIUM SERPL-SCNC: 4.2 MMOL/L — SIGNIFICANT CHANGE UP (ref 3.5–5.3)
PROTHROM AB SERPL-ACNC: 11 SEC — SIGNIFICANT CHANGE UP (ref 9.9–13.4)
RBC # BLD: 3.08 M/UL — LOW (ref 3.8–5.2)
RBC # FLD: 12 % — SIGNIFICANT CHANGE UP (ref 10.3–14.5)
SODIUM SERPL-SCNC: 137 MMOL/L — SIGNIFICANT CHANGE UP (ref 135–145)
WBC # BLD: 6.88 K/UL — SIGNIFICANT CHANGE UP (ref 3.8–10.5)
WBC # FLD AUTO: 6.88 K/UL — SIGNIFICANT CHANGE UP (ref 3.8–10.5)

## 2025-04-17 PROCEDURE — 88309 TISSUE EXAM BY PATHOLOGIST: CPT | Mod: 26

## 2025-04-17 PROCEDURE — 88305 TISSUE EXAM BY PATHOLOGIST: CPT | Mod: 26

## 2025-04-17 PROCEDURE — 38724 REMOVAL OF LYMPH NODES NECK: CPT | Mod: RT

## 2025-04-17 PROCEDURE — 99233 SBSQ HOSP IP/OBS HIGH 50: CPT

## 2025-04-17 PROCEDURE — 15240 FTH/GFT F/C/C/M/N/AX/G/H/F20: CPT

## 2025-04-17 PROCEDURE — 41120 PARTIAL REMOVAL OF TONGUE: CPT | Mod: RT

## 2025-04-17 RX ORDER — INSULIN GLARGINE-YFGN 100 [IU]/ML
8 INJECTION, SOLUTION SUBCUTANEOUS AT BEDTIME
Refills: 0 | Status: DISCONTINUED | OUTPATIENT
Start: 2025-04-17 | End: 2025-04-19

## 2025-04-17 RX ORDER — ONDANSETRON HCL/PF 4 MG/2 ML
4 VIAL (ML) INJECTION ONCE
Refills: 0 | Status: DISCONTINUED | OUTPATIENT
Start: 2025-04-17 | End: 2025-04-17

## 2025-04-17 RX ORDER — SODIUM CHLORIDE 9 G/1000ML
1000 INJECTION, SOLUTION INTRAVENOUS
Refills: 0 | Status: DISCONTINUED | OUTPATIENT
Start: 2025-04-17 | End: 2025-04-17

## 2025-04-17 RX ORDER — DEXTROSE 50 % IN WATER 50 %
25 SYRINGE (ML) INTRAVENOUS ONCE
Refills: 0 | Status: DISCONTINUED | OUTPATIENT
Start: 2025-04-17 | End: 2025-04-19

## 2025-04-17 RX ORDER — GLUCAGON 3 MG/1
1 POWDER NASAL ONCE
Refills: 0 | Status: DISCONTINUED | OUTPATIENT
Start: 2025-04-17 | End: 2025-04-19

## 2025-04-17 RX ORDER — HYDROMORPHONE/SOD CHLOR,ISO/PF 2 MG/10 ML
0.25 SYRINGE (ML) INJECTION
Refills: 0 | Status: DISCONTINUED | OUTPATIENT
Start: 2025-04-17 | End: 2025-04-17

## 2025-04-17 RX ORDER — HYDROMORPHONE/SOD CHLOR,ISO/PF 2 MG/10 ML
0.5 SYRINGE (ML) INJECTION EVERY 6 HOURS
Refills: 0 | Status: DISCONTINUED | OUTPATIENT
Start: 2025-04-17 | End: 2025-04-19

## 2025-04-17 RX ORDER — INSULIN LISPRO 100 U/ML
INJECTION, SOLUTION INTRAVENOUS; SUBCUTANEOUS AT BEDTIME
Refills: 0 | Status: DISCONTINUED | OUTPATIENT
Start: 2025-04-17 | End: 2025-04-19

## 2025-04-17 RX ORDER — SODIUM CHLORIDE 9 G/1000ML
1000 INJECTION, SOLUTION INTRAVENOUS
Refills: 0 | Status: DISCONTINUED | OUTPATIENT
Start: 2025-04-17 | End: 2025-04-19

## 2025-04-17 RX ORDER — INSULIN LISPRO 100 U/ML
4 INJECTION, SOLUTION INTRAVENOUS; SUBCUTANEOUS
Refills: 0 | Status: DISCONTINUED | OUTPATIENT
Start: 2025-04-17 | End: 2025-04-18

## 2025-04-17 RX ORDER — DEXTROSE 50 % IN WATER 50 %
12.5 SYRINGE (ML) INTRAVENOUS ONCE
Refills: 0 | Status: DISCONTINUED | OUTPATIENT
Start: 2025-04-17 | End: 2025-04-19

## 2025-04-17 RX ORDER — CEFAZOLIN SODIUM IN 0.9 % NACL 3 G/100 ML
500 INTRAVENOUS SOLUTION, PIGGYBACK (ML) INTRAVENOUS EVERY 8 HOURS
Refills: 0 | Status: DISCONTINUED | OUTPATIENT
Start: 2025-04-17 | End: 2025-04-17

## 2025-04-17 RX ORDER — CEFAZOLIN SODIUM IN 0.9 % NACL 3 G/100 ML
2000 INTRAVENOUS SOLUTION, PIGGYBACK (ML) INTRAVENOUS ONCE
Refills: 0 | Status: DISCONTINUED | OUTPATIENT
Start: 2025-04-17 | End: 2025-04-17

## 2025-04-17 RX ORDER — FENTANYL CITRATE-0.9 % NACL/PF 100MCG/2ML
25 SYRINGE (ML) INTRAVENOUS
Refills: 0 | Status: DISCONTINUED | OUTPATIENT
Start: 2025-04-17 | End: 2025-04-17

## 2025-04-17 RX ORDER — INSULIN LISPRO 100 U/ML
INJECTION, SOLUTION INTRAVENOUS; SUBCUTANEOUS
Refills: 0 | Status: DISCONTINUED | OUTPATIENT
Start: 2025-04-17 | End: 2025-04-19

## 2025-04-17 RX ORDER — DEXTROSE 50 % IN WATER 50 %
15 SYRINGE (ML) INTRAVENOUS ONCE
Refills: 0 | Status: DISCONTINUED | OUTPATIENT
Start: 2025-04-17 | End: 2025-04-19

## 2025-04-17 RX ORDER — LEVOTHYROXINE SODIUM 300 MCG
50 TABLET ORAL DAILY
Refills: 0 | Status: DISCONTINUED | OUTPATIENT
Start: 2025-04-17 | End: 2025-04-19

## 2025-04-17 RX ORDER — ACETAMINOPHEN 500 MG/5ML
1000 LIQUID (ML) ORAL EVERY 8 HOURS
Refills: 0 | Status: COMPLETED | OUTPATIENT
Start: 2025-04-17 | End: 2025-04-17

## 2025-04-17 RX ORDER — CEFAZOLIN SODIUM IN 0.9 % NACL 3 G/100 ML
500 INTRAVENOUS SOLUTION, PIGGYBACK (ML) INTRAVENOUS EVERY 8 HOURS
Refills: 0 | Status: DISCONTINUED | OUTPATIENT
Start: 2025-04-17 | End: 2025-04-19

## 2025-04-17 RX ORDER — METOPROLOL SUCCINATE 50 MG/1
100 TABLET, EXTENDED RELEASE ORAL DAILY
Refills: 0 | Status: DISCONTINUED | OUTPATIENT
Start: 2025-04-17 | End: 2025-04-19

## 2025-04-17 RX ADMIN — Medication 40 MILLIGRAM(S): at 05:12

## 2025-04-17 RX ADMIN — Medication 400 MILLIGRAM(S): at 17:36

## 2025-04-17 RX ADMIN — Medication 25 MILLIGRAM(S): at 22:08

## 2025-04-17 RX ADMIN — INSULIN GLARGINE-YFGN 8 UNIT(S): 100 INJECTION, SOLUTION SUBCUTANEOUS at 22:03

## 2025-04-17 RX ADMIN — Medication 25 MILLIGRAM(S): at 05:12

## 2025-04-17 RX ADMIN — INSULIN LISPRO 2: 100 INJECTION, SOLUTION INTRAVENOUS; SUBCUTANEOUS at 16:37

## 2025-04-17 RX ADMIN — Medication 3 MILLILITER(S): at 21:52

## 2025-04-17 RX ADMIN — Medication 1 APPLICATION(S): at 05:12

## 2025-04-17 RX ADMIN — METOPROLOL SUCCINATE 100 MILLIGRAM(S): 50 TABLET, EXTENDED RELEASE ORAL at 05:11

## 2025-04-17 RX ADMIN — Medication 1000 MILLIGRAM(S): at 18:36

## 2025-04-17 RX ADMIN — INSULIN LISPRO 4 UNIT(S): 100 INJECTION, SOLUTION INTRAVENOUS; SUBCUTANEOUS at 17:36

## 2025-04-17 RX ADMIN — Medication 50 MICROGRAM(S): at 05:12

## 2025-04-17 RX ADMIN — Medication 500 MILLIGRAM(S): at 22:01

## 2025-04-18 ENCOUNTER — TRANSCRIPTION ENCOUNTER (OUTPATIENT)
Age: 75
End: 2025-04-18

## 2025-04-18 LAB
GLUCOSE BLDC GLUCOMTR-MCNC: 143 MG/DL — HIGH (ref 70–99)
GLUCOSE BLDC GLUCOMTR-MCNC: 157 MG/DL — HIGH (ref 70–99)
GLUCOSE BLDC GLUCOMTR-MCNC: 228 MG/DL — HIGH (ref 70–99)
GLUCOSE BLDC GLUCOMTR-MCNC: 92 MG/DL — SIGNIFICANT CHANGE UP (ref 70–99)

## 2025-04-18 PROCEDURE — 99232 SBSQ HOSP IP/OBS MODERATE 35: CPT

## 2025-04-18 RX ORDER — INSULIN GLARGINE-YFGN 100 [IU]/ML
8 INJECTION, SOLUTION SUBCUTANEOUS
Qty: 1 | Refills: 0
Start: 2025-04-18

## 2025-04-18 RX ORDER — CEPHALEXIN 250 MG/1
10 CAPSULE ORAL
Qty: 1 | Refills: 0
Start: 2025-04-18 | End: 2025-04-20

## 2025-04-18 RX ORDER — LEVOTHYROXINE SODIUM 300 MCG
1 TABLET ORAL
Qty: 0 | Refills: 0 | DISCHARGE
Start: 2025-04-18

## 2025-04-18 RX ORDER — OXYCODONE HYDROCHLORIDE 30 MG/1
1 TABLET ORAL
Qty: 8 | Refills: 0
Start: 2025-04-18 | End: 2025-04-19

## 2025-04-18 RX ADMIN — INSULIN LISPRO 4: 100 INJECTION, SOLUTION INTRAVENOUS; SUBCUTANEOUS at 13:30

## 2025-04-18 RX ADMIN — Medication 3 MILLILITER(S): at 13:13

## 2025-04-18 RX ADMIN — Medication 25 MILLIGRAM(S): at 13:29

## 2025-04-18 RX ADMIN — INSULIN LISPRO 4 UNIT(S): 100 INJECTION, SOLUTION INTRAVENOUS; SUBCUTANEOUS at 09:06

## 2025-04-18 RX ADMIN — Medication 50 MICROGRAM(S): at 05:29

## 2025-04-18 RX ADMIN — Medication 500 MILLIGRAM(S): at 13:29

## 2025-04-18 RX ADMIN — Medication 25 MILLIGRAM(S): at 05:28

## 2025-04-18 RX ADMIN — Medication 25 MILLIGRAM(S): at 21:20

## 2025-04-18 RX ADMIN — Medication 3 MILLILITER(S): at 06:35

## 2025-04-18 RX ADMIN — Medication 500 MILLIGRAM(S): at 21:20

## 2025-04-18 RX ADMIN — METOPROLOL SUCCINATE 100 MILLIGRAM(S): 50 TABLET, EXTENDED RELEASE ORAL at 05:29

## 2025-04-18 RX ADMIN — INSULIN GLARGINE-YFGN 8 UNIT(S): 100 INJECTION, SOLUTION SUBCUTANEOUS at 21:22

## 2025-04-18 RX ADMIN — Medication 500 MILLIGRAM(S): at 05:29

## 2025-04-18 RX ADMIN — Medication 3 MILLILITER(S): at 22:00

## 2025-04-18 RX ADMIN — Medication 40 MILLIGRAM(S): at 05:29

## 2025-04-19 VITALS
RESPIRATION RATE: 16 BRPM | OXYGEN SATURATION: 94 % | SYSTOLIC BLOOD PRESSURE: 152 MMHG | HEART RATE: 83 BPM | TEMPERATURE: 100 F | DIASTOLIC BLOOD PRESSURE: 65 MMHG

## 2025-04-19 LAB
GLUCOSE BLDC GLUCOMTR-MCNC: 122 MG/DL — HIGH (ref 70–99)
GLUCOSE BLDC GLUCOMTR-MCNC: 126 MG/DL — HIGH (ref 70–99)

## 2025-04-19 PROCEDURE — 88305 TISSUE EXAM BY PATHOLOGIST: CPT

## 2025-04-19 PROCEDURE — 84295 ASSAY OF SERUM SODIUM: CPT

## 2025-04-19 PROCEDURE — 84132 ASSAY OF SERUM POTASSIUM: CPT

## 2025-04-19 PROCEDURE — 85014 HEMATOCRIT: CPT

## 2025-04-19 PROCEDURE — 88309 TISSUE EXAM BY PATHOLOGIST: CPT

## 2025-04-19 PROCEDURE — 83605 ASSAY OF LACTIC ACID: CPT

## 2025-04-19 PROCEDURE — 85027 COMPLETE CBC AUTOMATED: CPT

## 2025-04-19 PROCEDURE — 93005 ELECTROCARDIOGRAM TRACING: CPT

## 2025-04-19 PROCEDURE — 80048 BASIC METABOLIC PNL TOTAL CA: CPT

## 2025-04-19 PROCEDURE — 82009 KETONE BODYS QUAL: CPT

## 2025-04-19 PROCEDURE — 82330 ASSAY OF CALCIUM: CPT

## 2025-04-19 PROCEDURE — 82947 ASSAY GLUCOSE BLOOD QUANT: CPT

## 2025-04-19 PROCEDURE — 82803 BLOOD GASES ANY COMBINATION: CPT

## 2025-04-19 PROCEDURE — 99232 SBSQ HOSP IP/OBS MODERATE 35: CPT

## 2025-04-19 PROCEDURE — 84100 ASSAY OF PHOSPHORUS: CPT

## 2025-04-19 PROCEDURE — 82435 ASSAY OF BLOOD CHLORIDE: CPT

## 2025-04-19 PROCEDURE — 85018 HEMOGLOBIN: CPT

## 2025-04-19 PROCEDURE — 83735 ASSAY OF MAGNESIUM: CPT

## 2025-04-19 PROCEDURE — 82962 GLUCOSE BLOOD TEST: CPT

## 2025-04-19 PROCEDURE — 85610 PROTHROMBIN TIME: CPT

## 2025-04-19 PROCEDURE — 85730 THROMBOPLASTIN TIME PARTIAL: CPT

## 2025-04-19 RX ADMIN — Medication 500 MILLIGRAM(S): at 05:27

## 2025-04-19 RX ADMIN — Medication 50 MICROGRAM(S): at 05:29

## 2025-04-19 RX ADMIN — Medication 25 MILLIGRAM(S): at 05:28

## 2025-04-19 RX ADMIN — Medication 3 MILLILITER(S): at 06:29

## 2025-04-19 RX ADMIN — METOPROLOL SUCCINATE 100 MILLIGRAM(S): 50 TABLET, EXTENDED RELEASE ORAL at 05:28

## 2025-04-19 RX ADMIN — Medication 25 MILLIGRAM(S): at 15:37

## 2025-04-19 RX ADMIN — Medication 40 MILLIGRAM(S): at 05:28

## 2025-04-19 RX ADMIN — Medication 500 MILLIGRAM(S): at 15:37

## 2025-04-21 ENCOUNTER — APPOINTMENT (OUTPATIENT)
Dept: OTOLARYNGOLOGY | Facility: CLINIC | Age: 75
End: 2025-04-21
Payer: MEDICARE

## 2025-04-21 VITALS
HEART RATE: 87 BPM | BODY MASS INDEX: 19.35 KG/M2 | DIASTOLIC BLOOD PRESSURE: 54 MMHG | WEIGHT: 96 LBS | HEIGHT: 59 IN | SYSTOLIC BLOOD PRESSURE: 132 MMHG

## 2025-04-21 PROCEDURE — 99024 POSTOP FOLLOW-UP VISIT: CPT

## 2025-04-24 ENCOUNTER — APPOINTMENT (OUTPATIENT)
Dept: OTOLARYNGOLOGY | Facility: CLINIC | Age: 75
End: 2025-04-24
Payer: MEDICARE

## 2025-04-24 VITALS
BODY MASS INDEX: 19.35 KG/M2 | HEIGHT: 59 IN | DIASTOLIC BLOOD PRESSURE: 51 MMHG | SYSTOLIC BLOOD PRESSURE: 136 MMHG | HEART RATE: 85 BPM | WEIGHT: 96 LBS

## 2025-04-24 PROCEDURE — 99024 POSTOP FOLLOW-UP VISIT: CPT

## 2025-04-28 ENCOUNTER — APPOINTMENT (OUTPATIENT)
Dept: OTOLARYNGOLOGY | Facility: CLINIC | Age: 75
End: 2025-04-28

## 2025-04-28 LAB — SURGICAL PATHOLOGY STUDY: SIGNIFICANT CHANGE UP

## 2025-04-30 ENCOUNTER — APPOINTMENT (OUTPATIENT)
Dept: OTOLARYNGOLOGY | Facility: CLINIC | Age: 75
End: 2025-04-30
Payer: MEDICARE

## 2025-04-30 VITALS
HEIGHT: 59 IN | DIASTOLIC BLOOD PRESSURE: 51 MMHG | WEIGHT: 96 LBS | BODY MASS INDEX: 19.35 KG/M2 | SYSTOLIC BLOOD PRESSURE: 107 MMHG | HEART RATE: 78 BPM

## 2025-04-30 DIAGNOSIS — C02.9 MALIGNANT NEOPLASM OF TONGUE, UNSPECIFIED: ICD-10-CM

## 2025-04-30 PROCEDURE — 99024 POSTOP FOLLOW-UP VISIT: CPT

## 2025-05-07 ENCOUNTER — OUTPATIENT (OUTPATIENT)
Dept: OUTPATIENT SERVICES | Facility: HOSPITAL | Age: 75
LOS: 1 days | Discharge: HOME | End: 2025-05-07

## 2025-05-07 DIAGNOSIS — Z98.890 OTHER SPECIFIED POSTPROCEDURAL STATES: Chronic | ICD-10-CM

## 2025-05-07 DIAGNOSIS — C02.9 MALIGNANT NEOPLASM OF TONGUE, UNSPECIFIED: ICD-10-CM

## 2025-05-13 ENCOUNTER — APPOINTMENT (OUTPATIENT)
Dept: HEMATOLOGY ONCOLOGY | Facility: CLINIC | Age: 75
End: 2025-05-13

## 2025-05-13 ENCOUNTER — NON-APPOINTMENT (OUTPATIENT)
Age: 75
End: 2025-05-13

## 2025-05-13 ENCOUNTER — APPOINTMENT (OUTPATIENT)
Dept: RADIATION ONCOLOGY | Facility: CLINIC | Age: 75
End: 2025-05-13
Payer: MEDICARE

## 2025-05-13 ENCOUNTER — RESULT REVIEW (OUTPATIENT)
Age: 75
End: 2025-05-13

## 2025-05-13 VITALS
DIASTOLIC BLOOD PRESSURE: 61 MMHG | RESPIRATION RATE: 16 BRPM | WEIGHT: 91 LBS | BODY MASS INDEX: 18.35 KG/M2 | SYSTOLIC BLOOD PRESSURE: 129 MMHG | OXYGEN SATURATION: 90 % | HEIGHT: 59 IN | HEART RATE: 84 BPM

## 2025-05-13 LAB
BASOPHILS # BLD AUTO: 0.08 K/UL — SIGNIFICANT CHANGE UP (ref 0–0.2)
BASOPHILS NFR BLD AUTO: 1.1 % — SIGNIFICANT CHANGE UP (ref 0–2)
EOSINOPHIL # BLD AUTO: 0.47 K/UL — SIGNIFICANT CHANGE UP (ref 0–0.5)
EOSINOPHIL NFR BLD AUTO: 6.4 % — HIGH (ref 0–6)
HCT VFR BLD CALC: 30.6 % — LOW (ref 34.5–45)
HGB BLD-MCNC: 10 G/DL — LOW (ref 11.5–15.5)
IMM GRANULOCYTES # BLD AUTO: 0.08 K/UL — HIGH (ref 0–0.07)
IMM GRANULOCYTES NFR BLD AUTO: 1.1 % — HIGH (ref 0–0.9)
LYMPHOCYTES # BLD AUTO: 1.25 K/UL — SIGNIFICANT CHANGE UP (ref 1–3.3)
LYMPHOCYTES NFR BLD AUTO: 16.9 % — SIGNIFICANT CHANGE UP (ref 13–44)
MCHC RBC-ENTMCNC: 31.2 PG — SIGNIFICANT CHANGE UP (ref 27–34)
MCHC RBC-ENTMCNC: 32.7 G/DL — SIGNIFICANT CHANGE UP (ref 32–36)
MCV RBC AUTO: 95.3 FL — SIGNIFICANT CHANGE UP (ref 80–100)
MONOCYTES # BLD AUTO: 0.56 K/UL — SIGNIFICANT CHANGE UP (ref 0–0.9)
MONOCYTES NFR BLD AUTO: 7.6 % — SIGNIFICANT CHANGE UP (ref 2–14)
NEUTROPHILS # BLD AUTO: 4.95 K/UL — SIGNIFICANT CHANGE UP (ref 1.8–7.4)
NEUTROPHILS NFR BLD AUTO: 66.9 % — SIGNIFICANT CHANGE UP (ref 43–77)
NRBC # BLD AUTO: 0 K/UL — SIGNIFICANT CHANGE UP (ref 0–0)
NRBC # FLD: 0 K/UL — SIGNIFICANT CHANGE UP (ref 0–0)
NRBC BLD AUTO-RTO: 0 /100 WBCS — SIGNIFICANT CHANGE UP (ref 0–0)
PLATELET # BLD AUTO: 217 K/UL — SIGNIFICANT CHANGE UP (ref 150–400)
PMV BLD: 11.7 FL — SIGNIFICANT CHANGE UP (ref 7–13)
RBC # BLD: 3.21 M/UL — LOW (ref 3.8–5.2)
RBC # FLD: 11.8 % — SIGNIFICANT CHANGE UP (ref 10.3–14.5)
WBC # BLD: 7.39 K/UL — SIGNIFICANT CHANGE UP (ref 3.8–10.5)
WBC # FLD AUTO: 7.39 K/UL — SIGNIFICANT CHANGE UP (ref 3.8–10.5)

## 2025-05-13 PROCEDURE — 99205 OFFICE O/P NEW HI 60 MIN: CPT

## 2025-05-13 PROCEDURE — G2211 COMPLEX E/M VISIT ADD ON: CPT

## 2025-05-14 LAB
ALBUMIN SERPL ELPH-MCNC: 4.3 G/DL
ALP BLD-CCNC: 102 U/L
ALT SERPL-CCNC: 22 U/L
ANION GAP SERPL CALC-SCNC: 13 MMOL/L
AST SERPL-CCNC: 22 U/L
BILIRUB SERPL-MCNC: 0.4 MG/DL
BUN SERPL-MCNC: 27 MG/DL
CALCIUM SERPL-MCNC: 9.8 MG/DL
CHLORIDE SERPL-SCNC: 102 MMOL/L
CO2 SERPL-SCNC: 23 MMOL/L
CREAT SERPL-MCNC: 1.51 MG/DL
EGFRCR SERPLBLD CKD-EPI 2021: 36 ML/MIN/1.73M2
GLUCOSE SERPL-MCNC: 318 MG/DL
HAV IGM SER QL: NONREACTIVE
HBV CORE IGG+IGM SER QL: NONREACTIVE
HBV CORE IGM SER QL: NONREACTIVE
HBV SURFACE AB SER QL: NONREACTIVE
HBV SURFACE AG SER QL: NONREACTIVE
HCV AB SER QL: NONREACTIVE
HCV S/CO RATIO: 0.18 S/CO
INR PPP: 0.96 RATIO
MAGNESIUM SERPL-MCNC: 2 MG/DL
POTASSIUM SERPL-SCNC: 5.8 MMOL/L
PROT SERPL-MCNC: 7.2 G/DL
PT BLD: 11.3 SEC
SODIUM SERPL-SCNC: 139 MMOL/L

## 2025-05-15 ENCOUNTER — NON-APPOINTMENT (OUTPATIENT)
Age: 75
End: 2025-05-15

## 2025-05-15 ENCOUNTER — APPOINTMENT (OUTPATIENT)
Dept: OTOLARYNGOLOGY | Facility: CLINIC | Age: 75
End: 2025-05-15
Payer: MEDICARE

## 2025-05-15 VITALS
HEIGHT: 59 IN | HEART RATE: 70 BPM | WEIGHT: 91 LBS | DIASTOLIC BLOOD PRESSURE: 74 MMHG | SYSTOLIC BLOOD PRESSURE: 125 MMHG | BODY MASS INDEX: 18.35 KG/M2

## 2025-05-15 DIAGNOSIS — C02.9 MALIGNANT NEOPLASM OF TONGUE, UNSPECIFIED: ICD-10-CM

## 2025-05-15 PROCEDURE — 99024 POSTOP FOLLOW-UP VISIT: CPT

## 2025-06-12 ENCOUNTER — APPOINTMENT (OUTPATIENT)
Dept: OTOLARYNGOLOGY | Facility: CLINIC | Age: 75
End: 2025-06-12
Payer: MEDICARE

## 2025-06-12 VITALS
WEIGHT: 91 LBS | SYSTOLIC BLOOD PRESSURE: 116 MMHG | HEIGHT: 59 IN | BODY MASS INDEX: 18.35 KG/M2 | DIASTOLIC BLOOD PRESSURE: 64 MMHG | HEART RATE: 80 BPM

## 2025-06-12 PROCEDURE — 99024 POSTOP FOLLOW-UP VISIT: CPT

## 2025-06-16 ENCOUNTER — APPOINTMENT (OUTPATIENT)
Dept: OTOLARYNGOLOGY | Facility: CLINIC | Age: 75
End: 2025-06-16

## 2025-07-17 ENCOUNTER — APPOINTMENT (OUTPATIENT)
Dept: OTOLARYNGOLOGY | Facility: CLINIC | Age: 75
End: 2025-07-17
Payer: MEDICARE

## 2025-07-17 VITALS
DIASTOLIC BLOOD PRESSURE: 63 MMHG | SYSTOLIC BLOOD PRESSURE: 159 MMHG | HEART RATE: 73 BPM | WEIGHT: 91 LBS | HEIGHT: 59 IN | BODY MASS INDEX: 18.35 KG/M2

## 2025-07-17 PROBLEM — K13.21 LEUKOPLAKIA, TONGUE: Status: ACTIVE | Noted: 2025-06-12

## 2025-07-17 PROCEDURE — 99213 OFFICE O/P EST LOW 20 MIN: CPT

## 2025-08-28 ENCOUNTER — APPOINTMENT (OUTPATIENT)
Dept: OTOLARYNGOLOGY | Facility: CLINIC | Age: 75
End: 2025-08-28
Payer: MEDICARE

## 2025-08-28 VITALS
HEIGHT: 59 IN | WEIGHT: 91 LBS | SYSTOLIC BLOOD PRESSURE: 135 MMHG | HEART RATE: 75 BPM | DIASTOLIC BLOOD PRESSURE: 95 MMHG | BODY MASS INDEX: 18.35 KG/M2

## 2025-08-28 DIAGNOSIS — C02.9 MALIGNANT NEOPLASM OF TONGUE, UNSPECIFIED: ICD-10-CM

## 2025-08-28 PROCEDURE — 99213 OFFICE O/P EST LOW 20 MIN: CPT

## 2025-08-28 RX ORDER — ACETAMINOPHEN 325 MG/1
TABLET ORAL
Refills: 0 | Status: ACTIVE | COMMUNITY

## (undated) DEVICE — ELCTR E/S NEEDLE 0.75"

## (undated) DEVICE — SUT VICRYL 3-0 18" TIES UNDYED

## (undated) DEVICE — DRAPE 3/4 SHEET 52X76"

## (undated) DEVICE — ELCTR GROUNDING PAD ADULT COVIDIEN

## (undated) DEVICE — DRAIN RESERVOIR FOR JACKSON PRATT 100CC CARDINAL

## (undated) DEVICE — VAGINAL PACKING 2"

## (undated) DEVICE — SUT SILK 2-0 30" SH

## (undated) DEVICE — SUT CHROMIC 3-0 27" RB-1

## (undated) DEVICE — ELCTR BOVIE TIP NEEDLE INSULATED 2.8" EDGE

## (undated) DEVICE — PACK MINOR WITH LAP

## (undated) DEVICE — PREP BETADINE KIT

## (undated) DEVICE — NERVE LOCATOR  III

## (undated) DEVICE — Device

## (undated) DEVICE — POSITIONER FOAM EGG CRATE ULNAR 2PCS (PINK)

## (undated) DEVICE — SUT VICRYL 2-0 27" SH UNDYED

## (undated) DEVICE — WARMING BLANKET LOWER ADULT

## (undated) DEVICE — DRAPE SPLIT SHEET 77" X 120"

## (undated) DEVICE — DRAIN JACKSON PRATT 7FR ROUND END NO TROCAR

## (undated) DEVICE — SUT PROLENE 0 30" CT-2

## (undated) DEVICE — VENODYNE/SCD SLEEVE CALF MEDIUM

## (undated) DEVICE — SUT CHROMIC 4-0 27" RB-1

## (undated) DEVICE — LONE STAR ELASTIC STAY HOOK 12MM BLUNT

## (undated) DEVICE — SUT SILK 2-0 18" FS

## (undated) DEVICE — SOL IRR POUR H2O 1000ML

## (undated) DEVICE — DRSG MASTISOL

## (undated) DEVICE — SOL IRR POUR NS 0.9% 1000ML

## (undated) DEVICE — LABELS BLANK W PEN

## (undated) DEVICE — DRSG STERISTRIPS 0.5 X 4"

## (undated) DEVICE — LAP PAD W RING 18 X 18"

## (undated) DEVICE — SUT CHROMIC 3-0 27" SH

## (undated) DEVICE — DRSG TELFA 3 X 8